# Patient Record
Sex: FEMALE | Race: WHITE | Employment: UNEMPLOYED | URBAN - NONMETROPOLITAN AREA
[De-identification: names, ages, dates, MRNs, and addresses within clinical notes are randomized per-mention and may not be internally consistent; named-entity substitution may affect disease eponyms.]

---
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
## 2020-08-28 ENCOUNTER — HOSPITAL ENCOUNTER (OUTPATIENT)
Dept: NURSING | Age: 20
Discharge: HOME | End: 2020-08-28
Payer: COMMERCIAL

## 2020-08-28 VITALS
DIASTOLIC BLOOD PRESSURE: 66 MMHG | RESPIRATION RATE: 16 BRPM | TEMPERATURE: 97.5 F | HEART RATE: 70 BPM | SYSTOLIC BLOOD PRESSURE: 106 MMHG

## 2020-08-28 LAB
ANTIBODY SCREEN: NORMAL
GESTATIONAL AGE(WEEKS): NORMAL

## 2020-08-28 PROCEDURE — 96372 THER/PROPH/DIAG INJ SC/IM: CPT

## 2020-08-28 PROCEDURE — 86850 RBC ANTIBODY SCREEN: CPT

## 2020-08-28 PROCEDURE — 6360000002 HC RX W HCPCS: Performed by: NURSE PRACTITIONER

## 2020-08-28 PROCEDURE — 36415 COLL VENOUS BLD VENIPUNCTURE: CPT

## 2020-08-28 RX ADMIN — HUMAN RHO(D) IMMUNE GLOBULIN 300 MCG: 300 INJECTION, SOLUTION INTRAMUSCULAR at 10:44

## 2020-08-28 NOTE — PROGRESS NOTES
1020 pt arrives to OPN for Rhogam injection. All questions and concerns addressed. 28 weeks gestation confirmed  26 PATIENT RIGHTS AND RESPONSIBILITIES SHEET OFFERED TO PT TO READ.   1025 lab at bedside  1030 call light in reach, refreshments offered  1035 _m___ Safety:       (Environmental)  Phuc Greych to environment   Ensure ID band is correct and in place/ allergy band as needed   Assess for fall risk   Initiate fall precautions as applicable (fall band, side rails, etc.)   Call light within reach   Bed in low position/ wheels locked    __m__ Pain:        Assess pain level and characteristics   Administer analgesics as ordered   Assess effectiveness of pain management and report to MD as needed    _m___ Knowledge Deficit:   Assess baseline knowledge   Provide teaching at level of understanding   Provide teaching via preferred learning method   Evaluate teaching effectiveness    __m_ Hemodynamic/Respiratory Status:       (Pre and Post Procedure Monitoring)   Assess/Monitor vital signs and LOC   Assess Baseline SpO2 prior to any sedation   Obtain weight/height   Assess vital signs/ LOC until patient meets discharge criteria   Monitor procedure site and notify MD of any issues       1045 WRITTEN DISCHARGE INSTRUCTIONS GIVEN TO PT-VERBALIZES UNDERSTANDING  1050 PT DISCHARGED AMBULATORY IN SATISFACTORY CONDITION

## 2020-10-19 PROBLEM — O47.9 FALSE LABOR: Status: ACTIVE | Noted: 2020-10-19

## 2020-11-15 ENCOUNTER — HOSPITAL ENCOUNTER (INPATIENT)
Age: 20
LOS: 3 days | Discharge: HOME | DRG: 560 | End: 2020-11-18
Attending: OBSTETRICS & GYNECOLOGY | Admitting: OBSTETRICS & GYNECOLOGY
Payer: MEDICARE

## 2020-11-15 ENCOUNTER — APPOINTMENT (OUTPATIENT)
Dept: LABOR AND DELIVERY | Age: 20
DRG: 560 | End: 2020-11-15
Payer: MEDICARE

## 2020-11-15 LAB
ABO: NORMAL
ANION GAP SERPL CALCULATED.3IONS-SCNC: 13 MEQ/L (ref 8–16)
ANTIBODY SCREEN: NORMAL
BASOPHILS # BLD: 0.2 %
BASOPHILS ABSOLUTE: 0 THOU/MM3 (ref 0–0.1)
BUN BLDV-MCNC: 10 MG/DL (ref 7–22)
CALCIUM SERPL-MCNC: 9.3 MG/DL (ref 8.5–10.5)
CHLORIDE BLD-SCNC: 101 MEQ/L (ref 98–111)
CO2: 23 MEQ/L (ref 23–33)
CREAT SERPL-MCNC: 0.6 MG/DL (ref 0.4–1.2)
EOSINOPHIL # BLD: 1.2 %
EOSINOPHILS ABSOLUTE: 0.1 THOU/MM3 (ref 0–0.4)
ERYTHROCYTE [DISTWIDTH] IN BLOOD BY AUTOMATED COUNT: 14 % (ref 11.5–14.5)
ERYTHROCYTE [DISTWIDTH] IN BLOOD BY AUTOMATED COUNT: 44.4 FL (ref 35–45)
GFR SERPL CREATININE-BSD FRML MDRD: > 90 ML/MIN/1.73M2
GLUCOSE BLD-MCNC: 103 MG/DL (ref 70–108)
HCT VFR BLD CALC: 35.7 % (ref 37–47)
HEMOGLOBIN: 11.8 GM/DL (ref 12–16)
IMMATURE GRANS (ABS): 0.04 THOU/MM3 (ref 0–0.07)
IMMATURE GRANULOCYTES: 0.3 %
LYMPHOCYTES # BLD: 21.6 %
LYMPHOCYTES ABSOLUTE: 2.6 THOU/MM3 (ref 1–4.8)
MCH RBC QN AUTO: 29.4 PG (ref 26–33)
MCHC RBC AUTO-ENTMCNC: 33.1 GM/DL (ref 32.2–35.5)
MCV RBC AUTO: 88.8 FL (ref 81–99)
MONOCYTES # BLD: 6 %
MONOCYTES ABSOLUTE: 0.7 THOU/MM3 (ref 0.4–1.3)
NUCLEATED RED BLOOD CELLS: 0 /100 WBC
PLATELET # BLD: 314 THOU/MM3 (ref 130–400)
PMV BLD AUTO: 9.7 FL (ref 9.4–12.4)
POTASSIUM SERPL-SCNC: 4.1 MEQ/L (ref 3.5–5.2)
RBC # BLD: 4.02 MILL/MM3 (ref 4.2–5.4)
RH FACTOR: NORMAL
SEG NEUTROPHILS: 70.7 %
SEGMENTED NEUTROPHILS ABSOLUTE COUNT: 8.4 THOU/MM3 (ref 1.8–7.7)
SODIUM BLD-SCNC: 137 MEQ/L (ref 135–145)
WBC # BLD: 11.9 THOU/MM3 (ref 4.8–10.8)

## 2020-11-15 PROCEDURE — 3E033VJ INTRODUCTION OF OTHER HORMONE INTO PERIPHERAL VEIN, PERCUTANEOUS APPROACH: ICD-10-PCS | Performed by: OBSTETRICS & GYNECOLOGY

## 2020-11-15 PROCEDURE — 86592 SYPHILIS TEST NON-TREP QUAL: CPT

## 2020-11-15 PROCEDURE — 1220000001 HC SEMI PRIVATE L&D R&B

## 2020-11-15 PROCEDURE — 85025 COMPLETE CBC W/AUTO DIFF WBC: CPT

## 2020-11-15 PROCEDURE — 6360000002 HC RX W HCPCS

## 2020-11-15 PROCEDURE — 80048 BASIC METABOLIC PNL TOTAL CA: CPT

## 2020-11-15 PROCEDURE — 6360000002 HC RX W HCPCS: Performed by: OBSTETRICS & GYNECOLOGY

## 2020-11-15 PROCEDURE — 80307 DRUG TEST PRSMV CHEM ANLYZR: CPT

## 2020-11-15 PROCEDURE — 86850 RBC ANTIBODY SCREEN: CPT

## 2020-11-15 PROCEDURE — 86901 BLOOD TYPING SEROLOGIC RH(D): CPT

## 2020-11-15 PROCEDURE — 10907ZC DRAINAGE OF AMNIOTIC FLUID, THERAPEUTIC FROM PRODUCTS OF CONCEPTION, VIA NATURAL OR ARTIFICIAL OPENING: ICD-10-PCS | Performed by: OBSTETRICS & GYNECOLOGY

## 2020-11-15 PROCEDURE — 2580000003 HC RX 258: Performed by: OBSTETRICS & GYNECOLOGY

## 2020-11-15 PROCEDURE — 36415 COLL VENOUS BLD VENIPUNCTURE: CPT

## 2020-11-15 PROCEDURE — 86900 BLOOD TYPING SEROLOGIC ABO: CPT

## 2020-11-15 RX ORDER — SEVOFLURANE 250 ML/250ML
1 LIQUID RESPIRATORY (INHALATION) CONTINUOUS PRN
Status: DISCONTINUED | OUTPATIENT
Start: 2020-11-15 | End: 2020-11-16 | Stop reason: HOSPADM

## 2020-11-15 RX ORDER — SODIUM CHLORIDE, SODIUM LACTATE, POTASSIUM CHLORIDE, CALCIUM CHLORIDE 600; 310; 30; 20 MG/100ML; MG/100ML; MG/100ML; MG/100ML
INJECTION, SOLUTION INTRAVENOUS CONTINUOUS
Status: DISCONTINUED | OUTPATIENT
Start: 2020-11-15 | End: 2020-11-16

## 2020-11-15 RX ORDER — MISOPROSTOL 200 UG/1
1000 TABLET ORAL PRN
Status: DISCONTINUED | OUTPATIENT
Start: 2020-11-15 | End: 2020-11-16 | Stop reason: HOSPADM

## 2020-11-15 RX ORDER — MORPHINE SULFATE 2 MG/ML
2 INJECTION, SOLUTION INTRAMUSCULAR; INTRAVENOUS
Status: DISCONTINUED | OUTPATIENT
Start: 2020-11-15 | End: 2020-11-16 | Stop reason: HOSPADM

## 2020-11-15 RX ORDER — IBUPROFEN 800 MG/1
800 TABLET ORAL EVERY 8 HOURS PRN
Status: DISCONTINUED | OUTPATIENT
Start: 2020-11-15 | End: 2020-11-16 | Stop reason: HOSPADM

## 2020-11-15 RX ORDER — MORPHINE SULFATE 4 MG/ML
4 INJECTION, SOLUTION INTRAMUSCULAR; INTRAVENOUS
Status: DISCONTINUED | OUTPATIENT
Start: 2020-11-15 | End: 2020-11-16 | Stop reason: HOSPADM

## 2020-11-15 RX ORDER — LIDOCAINE HYDROCHLORIDE 10 MG/ML
30 INJECTION, SOLUTION EPIDURAL; INFILTRATION; INTRACAUDAL; PERINEURAL PRN
Status: DISCONTINUED | OUTPATIENT
Start: 2020-11-15 | End: 2020-11-16 | Stop reason: HOSPADM

## 2020-11-15 RX ORDER — PENICILLIN G 3000000 [IU]/50ML
3 INJECTION, SOLUTION INTRAVENOUS EVERY 4 HOURS
Status: DISCONTINUED | OUTPATIENT
Start: 2020-11-16 | End: 2020-11-16 | Stop reason: HOSPADM

## 2020-11-15 RX ORDER — METHYLERGONOVINE MALEATE 0.2 MG/ML
200 INJECTION INTRAVENOUS PRN
Status: DISCONTINUED | OUTPATIENT
Start: 2020-11-15 | End: 2020-11-16 | Stop reason: HOSPADM

## 2020-11-15 RX ORDER — BUTORPHANOL TARTRATE 1 MG/ML
1 INJECTION, SOLUTION INTRAMUSCULAR; INTRAVENOUS
Status: DISCONTINUED | OUTPATIENT
Start: 2020-11-15 | End: 2020-11-16 | Stop reason: HOSPADM

## 2020-11-15 RX ORDER — ACETAMINOPHEN 325 MG/1
650 TABLET ORAL EVERY 4 HOURS PRN
Status: DISCONTINUED | OUTPATIENT
Start: 2020-11-15 | End: 2020-11-16 | Stop reason: HOSPADM

## 2020-11-15 RX ORDER — ONDANSETRON 2 MG/ML
8 INJECTION INTRAMUSCULAR; INTRAVENOUS EVERY 6 HOURS PRN
Status: DISCONTINUED | OUTPATIENT
Start: 2020-11-15 | End: 2020-11-16 | Stop reason: HOSPADM

## 2020-11-15 RX ORDER — TERBUTALINE SULFATE 1 MG/ML
0.25 INJECTION, SOLUTION SUBCUTANEOUS
Status: ACTIVE | OUTPATIENT
Start: 2020-11-15 | End: 2020-11-15

## 2020-11-15 RX ORDER — SODIUM CHLORIDE 0.9 % (FLUSH) 0.9 %
10 SYRINGE (ML) INJECTION PRN
Status: DISCONTINUED | OUTPATIENT
Start: 2020-11-15 | End: 2020-11-16 | Stop reason: HOSPADM

## 2020-11-15 RX ORDER — DIPHENHYDRAMINE HYDROCHLORIDE 50 MG/ML
25 INJECTION INTRAMUSCULAR; INTRAVENOUS EVERY 4 HOURS PRN
Status: DISCONTINUED | OUTPATIENT
Start: 2020-11-15 | End: 2020-11-16 | Stop reason: HOSPADM

## 2020-11-15 RX ADMIN — Medication 1 MILLI-UNITS/MIN: at 23:24

## 2020-11-15 RX ADMIN — DEXTROSE MONOHYDRATE 5 MILLION UNITS: 5 INJECTION INTRAVENOUS at 23:09

## 2020-11-15 RX ADMIN — SODIUM CHLORIDE, POTASSIUM CHLORIDE, SODIUM LACTATE AND CALCIUM CHLORIDE: 600; 310; 30; 20 INJECTION, SOLUTION INTRAVENOUS at 21:00

## 2020-11-16 ENCOUNTER — ANESTHESIA (OUTPATIENT)
Dept: LABOR AND DELIVERY | Age: 20
DRG: 560 | End: 2020-11-16
Payer: MEDICARE

## 2020-11-16 ENCOUNTER — ANESTHESIA EVENT (OUTPATIENT)
Dept: LABOR AND DELIVERY | Age: 20
DRG: 560 | End: 2020-11-16
Payer: MEDICARE

## 2020-11-16 LAB
AMPHETAMINE+METHAMPHETAMINE URINE SCREEN: NEGATIVE
BARBITURATE QUANTITATIVE URINE: NEGATIVE
BENZODIAZEPINE QUANTITATIVE URINE: NEGATIVE
CANNABINOID QUANTITATIVE URINE: POSITIVE
COCAINE METABOLITE QUANTITATIVE URINE: NEGATIVE
OPIATES, URINE: NEGATIVE
OXYCODONE: NEGATIVE
PHENCYCLIDINE QUANTITATIVE URINE: NEGATIVE
RPR: NONREACTIVE

## 2020-11-16 PROCEDURE — 7200000001 HC VAGINAL DELIVERY

## 2020-11-16 PROCEDURE — 3700000025 EPIDURAL BLOCK: Performed by: ANESTHESIOLOGY

## 2020-11-16 PROCEDURE — 1200000000 HC SEMI PRIVATE

## 2020-11-16 PROCEDURE — 6360000002 HC RX W HCPCS: Performed by: OBSTETRICS & GYNECOLOGY

## 2020-11-16 PROCEDURE — 6370000000 HC RX 637 (ALT 250 FOR IP): Performed by: OBSTETRICS & GYNECOLOGY

## 2020-11-16 PROCEDURE — 2580000003 HC RX 258: Performed by: OBSTETRICS & GYNECOLOGY

## 2020-11-16 PROCEDURE — 2500000003 HC RX 250 WO HCPCS: Performed by: ANESTHESIOLOGY

## 2020-11-16 PROCEDURE — 6360000002 HC RX W HCPCS: Performed by: NURSE ANESTHETIST, CERTIFIED REGISTERED

## 2020-11-16 RX ORDER — ROPIVACAINE HYDROCHLORIDE 2 MG/ML
INJECTION, SOLUTION EPIDURAL; INFILTRATION; PERINEURAL PRN
Status: DISCONTINUED | OUTPATIENT
Start: 2020-11-16 | End: 2020-11-16 | Stop reason: SDUPTHER

## 2020-11-16 RX ORDER — CARBOPROST TROMETHAMINE 250 UG/ML
250 INJECTION, SOLUTION INTRAMUSCULAR
Status: ACTIVE | OUTPATIENT
Start: 2020-11-16 | End: 2020-11-16

## 2020-11-16 RX ORDER — FERROUS SULFATE 325(65) MG
325 TABLET ORAL
Status: DISCONTINUED | OUTPATIENT
Start: 2020-11-17 | End: 2020-11-18 | Stop reason: HOSPADM

## 2020-11-16 RX ORDER — MISOPROSTOL 200 UG/1
800 TABLET ORAL
Status: ACTIVE | OUTPATIENT
Start: 2020-11-16 | End: 2020-11-16

## 2020-11-16 RX ORDER — ONDANSETRON 2 MG/ML
4 INJECTION INTRAMUSCULAR; INTRAVENOUS EVERY 6 HOURS PRN
Status: DISCONTINUED | OUTPATIENT
Start: 2020-11-16 | End: 2020-11-18 | Stop reason: HOSPADM

## 2020-11-16 RX ORDER — HYDROCODONE BITARTRATE AND ACETAMINOPHEN 5; 325 MG/1; MG/1
2 TABLET ORAL EVERY 4 HOURS PRN
Status: DISCONTINUED | OUTPATIENT
Start: 2020-11-16 | End: 2020-11-18 | Stop reason: HOSPADM

## 2020-11-16 RX ORDER — MORPHINE SULFATE 4 MG/ML
4 INJECTION, SOLUTION INTRAMUSCULAR; INTRAVENOUS
Status: DISCONTINUED | OUTPATIENT
Start: 2020-11-16 | End: 2020-11-18 | Stop reason: HOSPADM

## 2020-11-16 RX ORDER — HYDROCODONE BITARTRATE AND ACETAMINOPHEN 5; 325 MG/1; MG/1
1 TABLET ORAL EVERY 4 HOURS PRN
Status: DISCONTINUED | OUTPATIENT
Start: 2020-11-16 | End: 2020-11-18 | Stop reason: HOSPADM

## 2020-11-16 RX ORDER — ZOLPIDEM TARTRATE 5 MG/1
5 TABLET ORAL ONCE
Status: COMPLETED | OUTPATIENT
Start: 2020-11-16 | End: 2020-11-16

## 2020-11-16 RX ORDER — ONDANSETRON 2 MG/ML
4 INJECTION INTRAMUSCULAR; INTRAVENOUS EVERY 6 HOURS PRN
Status: DISCONTINUED | OUTPATIENT
Start: 2020-11-16 | End: 2020-11-16 | Stop reason: HOSPADM

## 2020-11-16 RX ORDER — CARBOPROST TROMETHAMINE 250 UG/ML
250 INJECTION, SOLUTION INTRAMUSCULAR PRN
Status: DISCONTINUED | OUTPATIENT
Start: 2020-11-16 | End: 2020-11-18 | Stop reason: HOSPADM

## 2020-11-16 RX ORDER — FENTANYL CITRATE 50 UG/ML
INJECTION, SOLUTION INTRAMUSCULAR; INTRAVENOUS
Status: COMPLETED
Start: 2020-11-16 | End: 2020-11-16

## 2020-11-16 RX ORDER — SODIUM CHLORIDE, SODIUM LACTATE, POTASSIUM CHLORIDE, CALCIUM CHLORIDE 600; 310; 30; 20 MG/100ML; MG/100ML; MG/100ML; MG/100ML
INJECTION, SOLUTION INTRAVENOUS CONTINUOUS
Status: DISCONTINUED | OUTPATIENT
Start: 2020-11-16 | End: 2020-11-18 | Stop reason: HOSPADM

## 2020-11-16 RX ORDER — DOCUSATE SODIUM 100 MG/1
100 CAPSULE, LIQUID FILLED ORAL 2 TIMES DAILY PRN
Status: DISCONTINUED | OUTPATIENT
Start: 2020-11-16 | End: 2020-11-18 | Stop reason: HOSPADM

## 2020-11-16 RX ORDER — ONDANSETRON 4 MG/1
8 TABLET, ORALLY DISINTEGRATING ORAL EVERY 8 HOURS PRN
Status: DISCONTINUED | OUTPATIENT
Start: 2020-11-16 | End: 2020-11-18 | Stop reason: HOSPADM

## 2020-11-16 RX ORDER — ROPIVACAINE HYDROCHLORIDE 2 MG/ML
INJECTION, SOLUTION EPIDURAL; INFILTRATION; PERINEURAL
Status: COMPLETED
Start: 2020-11-16 | End: 2020-11-16

## 2020-11-16 RX ORDER — ZOLPIDEM TARTRATE 10 MG/1
10 TABLET ORAL NIGHTLY PRN
Status: DISCONTINUED | OUTPATIENT
Start: 2020-11-16 | End: 2020-11-18 | Stop reason: HOSPADM

## 2020-11-16 RX ORDER — FENTANYL CITRATE 50 UG/ML
INJECTION, SOLUTION INTRAMUSCULAR; INTRAVENOUS PRN
Status: DISCONTINUED | OUTPATIENT
Start: 2020-11-16 | End: 2020-11-16 | Stop reason: SDUPTHER

## 2020-11-16 RX ORDER — NALOXONE HYDROCHLORIDE 0.4 MG/ML
0.4 INJECTION, SOLUTION INTRAMUSCULAR; INTRAVENOUS; SUBCUTANEOUS PRN
Status: DISCONTINUED | OUTPATIENT
Start: 2020-11-16 | End: 2020-11-16 | Stop reason: HOSPADM

## 2020-11-16 RX ORDER — MODIFIED LANOLIN
OINTMENT (GRAM) TOPICAL PRN
Status: DISCONTINUED | OUTPATIENT
Start: 2020-11-16 | End: 2020-11-18 | Stop reason: HOSPADM

## 2020-11-16 RX ORDER — DIPHENHYDRAMINE HCL 25 MG
50 TABLET ORAL EVERY 6 HOURS PRN
Status: DISCONTINUED | OUTPATIENT
Start: 2020-11-16 | End: 2020-11-18 | Stop reason: HOSPADM

## 2020-11-16 RX ORDER — IBUPROFEN 800 MG/1
800 TABLET ORAL 3 TIMES DAILY
Status: DISCONTINUED | OUTPATIENT
Start: 2020-11-16 | End: 2020-11-18 | Stop reason: HOSPADM

## 2020-11-16 RX ORDER — SODIUM CHLORIDE 0.9 % (FLUSH) 0.9 %
10 SYRINGE (ML) INJECTION EVERY 12 HOURS SCHEDULED
Status: DISCONTINUED | OUTPATIENT
Start: 2020-11-16 | End: 2020-11-18

## 2020-11-16 RX ORDER — SODIUM CHLORIDE 0.9 % (FLUSH) 0.9 %
10 SYRINGE (ML) INJECTION PRN
Status: DISCONTINUED | OUTPATIENT
Start: 2020-11-16 | End: 2020-11-18

## 2020-11-16 RX ORDER — METHYLERGONOVINE MALEATE 0.2 MG/ML
200 INJECTION INTRAVENOUS
Status: ACTIVE | OUTPATIENT
Start: 2020-11-16 | End: 2020-11-16

## 2020-11-16 RX ORDER — HYDROCORTISONE ACETATE 25 MG/1
25 SUPPOSITORY RECTAL 2 TIMES DAILY PRN
Status: DISCONTINUED | OUTPATIENT
Start: 2020-11-16 | End: 2020-11-18 | Stop reason: HOSPADM

## 2020-11-16 RX ORDER — MORPHINE SULFATE 4 MG/ML
2 INJECTION, SOLUTION INTRAMUSCULAR; INTRAVENOUS
Status: DISCONTINUED | OUTPATIENT
Start: 2020-11-16 | End: 2020-11-18 | Stop reason: HOSPADM

## 2020-11-16 RX ORDER — NALBUPHINE HCL 10 MG/ML
5 AMPUL (ML) INJECTION EVERY 4 HOURS PRN
Status: DISCONTINUED | OUTPATIENT
Start: 2020-11-16 | End: 2020-11-16 | Stop reason: HOSPADM

## 2020-11-16 RX ORDER — ACETAMINOPHEN 325 MG/1
650 TABLET ORAL EVERY 4 HOURS PRN
Status: DISCONTINUED | OUTPATIENT
Start: 2020-11-16 | End: 2020-11-18 | Stop reason: HOSPADM

## 2020-11-16 RX ADMIN — ZOLPIDEM TARTRATE 5 MG: 5 TABLET ORAL at 00:28

## 2020-11-16 RX ADMIN — ROPIVACAINE HYDROCHLORIDE 8 ML: 2 INJECTION, SOLUTION EPIDURAL; INFILTRATION at 15:23

## 2020-11-16 RX ADMIN — SODIUM CHLORIDE, POTASSIUM CHLORIDE, SODIUM LACTATE AND CALCIUM CHLORIDE: 600; 310; 30; 20 INJECTION, SOLUTION INTRAVENOUS at 12:30

## 2020-11-16 RX ADMIN — PENICILLIN G 3 MILLION UNITS: 3000000 INJECTION, SOLUTION INTRAVENOUS at 03:30

## 2020-11-16 RX ADMIN — PENICILLIN G 3 MILLION UNITS: 3000000 INJECTION, SOLUTION INTRAVENOUS at 15:28

## 2020-11-16 RX ADMIN — FENTANYL CITRATE 100 MCG: 50 INJECTION, SOLUTION INTRAMUSCULAR; INTRAVENOUS at 15:23

## 2020-11-16 RX ADMIN — BUTORPHANOL TARTRATE 1 MG: 1 INJECTION, SOLUTION INTRAMUSCULAR; INTRAVENOUS at 04:45

## 2020-11-16 RX ADMIN — ONDANSETRON 8 MG: 2 INJECTION INTRAMUSCULAR; INTRAVENOUS at 04:23

## 2020-11-16 RX ADMIN — PENICILLIN G 3 MILLION UNITS: 3000000 INJECTION, SOLUTION INTRAVENOUS at 07:39

## 2020-11-16 RX ADMIN — ROPIVACAINE HYDROCHLORIDE 8 ML: 2 INJECTION, SOLUTION EPIDURAL; INFILTRATION at 18:05

## 2020-11-16 RX ADMIN — PENICILLIN G 3 MILLION UNITS: 3000000 INJECTION, SOLUTION INTRAVENOUS at 19:35

## 2020-11-16 RX ADMIN — ONDANSETRON 8 MG: 2 INJECTION INTRAMUSCULAR; INTRAVENOUS at 12:36

## 2020-11-16 RX ADMIN — Medication 16 ML/HR: at 07:02

## 2020-11-16 RX ADMIN — PENICILLIN G 3 MILLION UNITS: 3000000 INJECTION, SOLUTION INTRAVENOUS at 11:32

## 2020-11-16 RX ADMIN — SODIUM CHLORIDE, POTASSIUM CHLORIDE, SODIUM LACTATE AND CALCIUM CHLORIDE: 600; 310; 30; 20 INJECTION, SOLUTION INTRAVENOUS at 04:09

## 2020-11-16 RX ADMIN — FENTANYL CITRATE 100 MCG: 50 INJECTION, SOLUTION INTRAMUSCULAR; INTRAVENOUS at 18:05

## 2020-11-16 RX ADMIN — SODIUM CHLORIDE, POTASSIUM CHLORIDE, SODIUM LACTATE AND CALCIUM CHLORIDE: 600; 310; 30; 20 INJECTION, SOLUTION INTRAVENOUS at 07:37

## 2020-11-16 RX ADMIN — IBUPROFEN 800 MG: 800 TABLET, FILM COATED ORAL at 23:47

## 2020-11-16 ASSESSMENT — PAIN SCALES - GENERAL
PAINLEVEL_OUTOF10: 1
PAINLEVEL_OUTOF10: 5
PAINLEVEL_OUTOF10: 1

## 2020-11-16 ASSESSMENT — PAIN DESCRIPTION - DESCRIPTORS
DESCRIPTORS: CRAMPING
DESCRIPTORS: CRAMPING

## 2020-11-16 NOTE — FLOWSHEET NOTE
Dr Alexandria Ocampo called the unit for an update, updated pt last checked an hour ago 6/80/-1, Md viewing strip from the office, fht's looking better, first time all day pt has not had repetitive variables, she was angry 2 hours ago when she was in pain, got epidural redosed and pt comfortable now, has not rested all day since needing all the position changes, encouraged her to rest, pitocin at 6 milliunits, has not been higher then that all day, ctx are good intensity, 2-3 min apart. Md is signing out to Dr Shan Amin.

## 2020-11-16 NOTE — ANESTHESIA PRE PROCEDURE
Department of Anesthesiology  Preprocedure Note       Name:  Nikkie Rosenthal   Age:  21 y.o.  :  2000                                          MRN:  [id number]         Date:  2020      Surgeon: * No surgeons listed *    Procedure: * No procedures listed *    Medications prior to admission:   Prior to Admission medications    Medication Sig Start Date End Date Taking?  Authorizing Provider   Prenatal MV-Min-Fe Fum-FA-DHA (PRENATAL 1 PO) Take by mouth   Yes Historical Provider, MD   ondansetron (ZOFRAN) 4 MG tablet 1 tablet every 4- 6  hours for nausea vomiting 19   Zayra Carreno MD       Current medications:    Current Facility-Administered Medications   Medication Dose Route Frequency Provider Last Rate Last Dose    lactated ringers infusion   Intravenous Continuous Sandhya Osullivan  mL/hr at 20 0409      sodium chloride flush 0.9 % injection 10 mL  10 mL Intravenous PRN Sandhya Osullivan MD        lidocaine PF 1 % injection 30 mL  30 mL Other PRN Sandhya Osullivan MD        butorphanol (STADOL) injection 1 mg  1 mg Intravenous Q1H PRN Sandhya Osullivan MD   1 mg at 20 0445    nitrous oxide 50% inhalation 1 each  1 each Inhalation Continuous PRN Sandhya Osullivan MD        acetaminophen (TYLENOL) tablet 650 mg  650 mg Oral Q4H PRN Sandhya Osullivan MD        ibuprofen (ADVIL;MOTRIN) tablet 800 mg  800 mg Oral Q8H PRN Sandhya Osullivan MD        morphine (PF) injection 2 mg  2 mg Intravenous Q2H PRN Sandhya Osullivan MD        Or   Jl Owens morphine injection 4 mg  4 mg Intravenous Q2H PRN Sandhya Osullivan MD        oxytocin (PITOCIN) 30 units in 500 mL infusion  1 cristobal-units/min Intravenous Continuous Sandhya Osullivan MD 6 mL/hr at 20 0410 6 cristobal-units/min at 20 0410    diphenhydrAMINE (BENADRYL) injection 25 mg  25 mg Intravenous Q4H PRN Sandhya Osullivan MD        [facility]etron [facility] injection 8 mg  8 mg Intravenous Q6H PRN Sandhya Osullivan MD   8 mg at 20 0423    oxytocin (PITOCIN) 10 unit bolus from the bag  10 Units Intravenous PRN Dedrick Bamberger, MD        And    oxytocin (PITOCIN) 30 units in 500 mL infusion  95 cristobal-units/min Intravenous PRN Dedrick Bamberger, MD        methylergonovine (METHERGINE) injection 200 mcg  200 mcg Intramuscular PRN Dedrick Bamberger, MD        miSOPROStol (CYTOTEC) tablet 1,000 mcg  1,000 mcg Rectal PRN Dedrick Bamberger, MD        witch hazel-glycerin (TUCKS) pad   Topical PRN Dedrick Bamberger, MD        benzocaine-menthol (DERMOPLAST) 20-0.5 % spray   Topical PRN Dedrick Bamberger, MD        penicillin G potassium IVPB 3 Million Units  3 Million Units Intravenous Q4H Dedrick Bamberger, MD   Stopped at 20 0400       Allergies:  No Known Allergies    Problem List:    Patient Active Problem List   Diagnosis Code    False labor O47.9       Past Medical History:  History reviewed. No pertinent past medical history. Past Surgical History:  History reviewed. No pertinent surgical history.     Social History:    Social History     Tobacco Use    Smoking status: Former Smoker     Packs/day: 1.50     Types: Cigarettes     Last attempt to quit: 2019     Years since quittin.5    Smokeless tobacco: Never Used   Substance Use Topics    Alcohol use: Never     Frequency: Never                                Counseling given: Not Answered      Vital Signs (Current):   Vitals:    20 0409 20 0424 20 0454 20 0524   BP:  102/63 126/82 120/81   Pulse:  73 70 67   Resp:   20   Temp: 36.3 °C (97.3 °F)      TempSrc: Temporal      SpO2: 99%      Weight:       Height:                                                  BP Readings from Last 3 Encounters:   20 120/81   10/19/20 122/79   20 106/66       NPO Status: Time of last liquid consumption: 1900                        Time of last solid consumption: 1400                        Date of last liquid consumption: 11/15/20                        Date of last solid food consumption: 11/15/20    BMI:   Wt Readings from Last 3 Encounters:   11/15/20 186 lb (84.4 kg)   08/22/19 212 lb (96.2 kg) (98 %, Z= 2.11)*   05/10/19 (!) 220 lb (99.8 kg) (99 %, Z= 2.20)*     * Growth percentiles are based on [facility] (Girls, 2-20 Years) data. Body mass index is 30.02 kg/m². CBC:   Lab Results   Component Value Date    WBC 11.9 11/15/2020    RBC 4.02 11/15/2020    HGB 11.8 11/15/2020    HCT 35.7 11/15/2020    MCV 88.8 11/15/2020    RDW 14.8 06/18/2016     11/15/2020       CMP:   Lab Results   Component Value Date     11/15/2020    K 4.1 11/15/2020     11/15/2020    CO2 23 11/15/2020    BUN 10 11/15/2020    CREATININE 0.6 11/15/2020    LABGLOM >90 11/15/2020    GLUCOSE 103 11/15/2020    PROT 7.8 08/22/2019    CALCIUM 9.3 11/15/2020    BILITOT 0.7 08/22/2019    ALKPHOS 107 08/22/2019    AST 23 08/22/2019    ALT 12 08/22/2019       POC Tests: No results for input(s): POCGLU, POCNA, POCK, POCCL, POCBUN, POCHEMO, POCHCT in the last 72 hours.     Coags: No results found for: PROTIME, INR, APTT    HCG (If Applicable):   Lab Results   Component Value Date    PREGSERUM NEGATIVE 08/22/2019        ABGs: No results found for: PHART, PO2ART, ETN2GUP, MEN4NEO, BEART, A9QLZGDV     Type & Screen (If Applicable):  Lab Results   Component Value Date    LABRH NEG 11/15/2020       Drug/Infectious Status (If Applicable):  No results found for: HIV, HEPCAB    COVID-19 Screening (If Applicable): No results found for: COVID19      Anesthesia Evaluation  Patient summary reviewed and Nursing notes reviewed no history of anesthetic complications:   Airway: Mallampati: II  TM distance: >3 FB   Neck ROM: full  Mouth opening: > = 3 FB Dental:          Pulmonary:Negative Pulmonary ROS                              Cardiovascular:  Exercise tolerance: good (>4 METS),                     Neuro/Psych:   Negative Neuro/Psych ROS              GI/Hepatic/Renal: Neg GI/Hepatic/Renal ROS Endo/Other: Negative Endo/Other ROS                    Abdominal:           Vascular: negative vascular ROS. Anesthesia Plan      epidural     ASA 2             Anesthetic plan and risks discussed with patient. Plan discussed with attending.                   DULCE Nash - CRNA   11/16/2020

## 2020-11-16 NOTE — PLAN OF CARE
Problem: Anxiety:  Goal: Level of anxiety will decrease  Description: Level of anxiety will decrease  Outcome: Ongoing  Note: Pt calm, sister and her mother at bedside     Problem: Breathing Pattern - Ineffective:  Goal: Able to breathe comfortably  Description: Able to breathe comfortably  Outcome: Ongoing  Note: Resp 18     Problem: Fluid Volume - Imbalance:  Goal: Absence of imbalanced fluid volume signs and symptoms  Description: Absence of imbalanced fluid volume signs and symptoms  Outcome: Ongoing  Note: I/O normal  Goal: Absence of intrapartum hemorrhage signs and symptoms  Description: Absence of intrapartum hemorrhage signs and symptoms  Outcome: Ongoing  Note: No signs of extra bleeding     Problem: Infection - Intrapartum Infection:  Goal: Will show no infection signs and symptoms  Description: Will show no infection signs and symptoms  Outcome: Ongoing  Note: No temps     Problem: Labor Process - Prolonged:  Goal: Uterine contractions within specified parameters  Description: Uterine contractions within specified parameters  Outcome: Ongoing  Note: Ctx 2-3 min apart     Problem:  Screening:  Goal: Ability to make informed decisions regarding treatment has improved  Description: Ability to make informed decisions regarding treatment has improved  Outcome: Ongoing  Note: Pt able to make decisions     Problem: Pain - Acute:  Goal: Pain level will decrease  Description: Pain level will decrease  Outcome: Ongoing  Note: Pt comfortable with epidural, pain 0, pain goal 8     Problem: Tissue Perfusion - Uteroplacental, Altered:  Goal: Absence of abnormal fetal heart rate pattern  Description: Absence of abnormal fetal heart rate pattern  Outcome: Ongoing  Note: Reactive fhts     Problem: Urinary Retention:  Goal: Urinary elimination within specified parameters  Description: Urinary elimination within specified parameters  Outcome: Ongoing  Note: Alfonso with clear yellow urine     Problem: Discharge Planning:  Goal: Discharged to appropriate level of care  Description: Discharged to appropriate level of care  Outcome: Ongoing  Note: Transfer to mom baby after recovery  Care plan reviewed with patient and her mother . Patient and  mother verbalize understanding of the plan of care and contribute to goal setting.

## 2020-11-16 NOTE — FLOWSHEET NOTE
RN to bedside pt is screaming at the nurse since she is in pain, RN informed pt we are waiting on CRNA who is on the unit, pt screaming she wants to leave she will go to [place].

## 2020-11-16 NOTE — FLOWSHEET NOTE
Dr Jocelin Lombardi called the unit, she viewed strip from the office, pt is continually having repetitive, deep variables, last had acceleration when pt vomited around 1230, cervix still unchanged just checked 5/80/-2, Md states still good variability, orders to stop pitocin for 30 min then restart.

## 2020-11-16 NOTE — FLOWSHEET NOTE
Dr Latoya Tate returned call notified of repetitive deep variables down to 60's-70's, slow return to baseline, down for 50-60 seconds with each, fse placed, pt placed on both sides with no change, pitocin turned off, orders for amnioinfusion.

## 2020-11-16 NOTE — FLOWSHEET NOTE
Dr Olivia Ruiz text to please view strip again, cervix just checked and still 5/80/-2, pitocin at 4 milliunits.

## 2020-11-16 NOTE — FLOWSHEET NOTE
Pt requesting more pain meds states her back is hurting, informed pt she does not get IV meds with the epidural, explained epidural PCA and pt used that. Pt appears comfortable and is not having to breath through ctx.

## 2020-11-16 NOTE — FLOWSHEET NOTE
Pt of Dr. Rose Merida,  39w2d, for a ballard induction. Pt denies leaking of fluid, vaginal bleeding, and ctx. Pt notes fetal movement. Pt oriented to room and call light system. Pt to bathroom to change into gown and to void, informed of maternal drug testing policy in place on all laboring patients. Verbal consent received, paper consent to be signed and urine to be sent. Explained patients right to have family, representative or physician notified of their admission. Patient has Declined for physician to be notified. Patient has Declined for family/representative to be notified.

## 2020-11-16 NOTE — PROGRESS NOTES
Pt did well overnight. Category 1 tracing. CVX 4/80/-2. ROM for clear fluid.  Continue to titrate pitocin for labor  She is comfortable with an epidural

## 2020-11-16 NOTE — FLOWSHEET NOTE
Dr Bianca Davis text to please call, Md returned call notified pt had amnioinfusion bolus started at 0910, variables have not resolved in the last 3 hours since we last spoke, still has good variability, accelerations at times, variables were not as deep, now back down to 60-70's, pt has been placed in all positions and strip looks the same, pitocin now at 6 milliunits, cervix 5/70/-2, not much change, Md pulling up strip in the office, orders to not turn off pitocin.

## 2020-11-16 NOTE — FLOWSHEET NOTE
Ochoa Joseph CRNA at bedside for epidural placement. Questions and concerns addressed, timeout complete.

## 2020-11-16 NOTE — ANESTHESIA PROCEDURE NOTES
Epidural Block    Patient location during procedure: OB  Reason for block: labor epidural  Staffing  Anesthesiologist: Elham Matthews MD  Resident/CRNA: DULCE Lobo - CRNA  Performed: resident/CRNA   Preanesthetic Checklist  Completed: patient identified, site marked, surgical consent, pre-op evaluation, timeout performed, IV checked, risks and benefits discussed, monitors and equipment checked, anesthesia consent given, oxygen available and patient being monitored  Epidural  Patient position: sitting  Prep: site prepped and draped  Patient monitoring: frequent blood pressure checks and continuous pulse ox  Approach: midline  Location: lumbar (1-5)  Injection technique: ROBYN air  Provider prep: mask  Needle  Needle type: Tuohy   Needle gauge: 18 G  Needle length: 3.5 in  Needle insertion depth: 7 cm  Catheter type: side hole  Catheter size: 18 G  Catheter at skin depth: 12 cm  Test dose: negative  Assessment  Hemodynamics: stable  Attempts: 1

## 2020-11-16 NOTE — FLOWSHEET NOTE
Dr Brooklyn Bashir at the desk updated on pt status, cervix just checked 7-8/80/-1, pt hurting again, just called for a second redose, fht's with good variability, having variables with ctx, not as deep and long as they have been most of the day.

## 2020-11-16 NOTE — FLOWSHEET NOTE
Dr. Clayton Coburn notified that ballard bulb is out and pt requesting something to help her sleep. Order received for [employer].

## 2020-11-16 NOTE — FLOWSHEET NOTE
Dr. Cristi Osullivan at bedside for cervical ballard bulb placement at this time. Questions and concerns addressed, POC discussed with patient.

## 2020-11-16 NOTE — H&P
[address] [place] [address]  History and Physical Update    Pt Name: Mile Palm  MRN: [id number]  YOB: 2000  Date of evaluation: 11/15/2020    [x] I have examined the patient and reviewed the H&P/Consult and there are no changes to the patient or plans. 26yo  at 43/3 for induction of labor due to term gestation. CE: /2. Ballard bulb placed without difficulty. Cat 1 tracing. GBS pos-PCN. Pitocin at 2mu until ballard out and then up per protocol. [] I have examined the patient and reviewed the H&P/Consult and have noted the following changes:       Discussion with the patient and/ or family for proposed care, treatment, services; benefits, risks, side effects; likelihood of achieving goals and potential problems that may occur during recuperation was had and all questions were answered. Discussion with the patient and/ or family of reasonable alternatives to the proposed care, treatment, services and the discussion of the risks, benefits, side effects related to the alternatives and the risk related to not receiving the proposed care treatment services was also had and all questions were answered. If this is for an elective surgical procedure then The patient was counseled at length about the risks of devi Covid-19 during their perioperative period and any recovery window from their procedure. The patient was made aware that devi Covid-19  may worsen their prognosis for recovering from their procedure  and lend to a higher morbidity and/or mortality risk. All material risks, benefits, and reasonable alternatives including postponing the procedure were discussed. The patient  does wish to proceed with the procedure at this time.              Bell Lopez  Electronically signed 11/15/2020 at 10:09 PM

## 2020-11-16 NOTE — FLOWSHEET NOTE
Pts mother at the desk requesting RN, to the bedside, pt vomiting small amount. Too soon for another dose of zofran.

## 2020-11-16 NOTE — FLOWSHEET NOTE
Dr Jeannine Dominguez at bedside to break water, updated pt got her epidural at shift change, fhts having some variables, have not rechecked cervix.

## 2020-11-17 PROCEDURE — 1200000000 HC SEMI PRIVATE

## 2020-11-17 PROCEDURE — 6370000000 HC RX 637 (ALT 250 FOR IP): Performed by: OBSTETRICS & GYNECOLOGY

## 2020-11-17 RX ADMIN — IBUPROFEN 800 MG: 800 TABLET, FILM COATED ORAL at 10:22

## 2020-11-17 RX ADMIN — DOCUSATE SODIUM 100 MG: 100 CAPSULE, LIQUID FILLED ORAL at 13:19

## 2020-11-17 RX ADMIN — IBUPROFEN 800 MG: 800 TABLET, FILM COATED ORAL at 23:16

## 2020-11-17 ASSESSMENT — PAIN SCALES - GENERAL
PAINLEVEL_OUTOF10: 0
PAINLEVEL_OUTOF10: 0
PAINLEVEL_OUTOF10: 1
PAINLEVEL_OUTOF10: 4
PAINLEVEL_OUTOF10: 0

## 2020-11-17 NOTE — PROGRESS NOTES
ANTERIOR LIP. RETURN OF SEVERE VARIABLES, BUT GOOD RECOVERY TO NORMAL BASELINE, GOOD BTBV, ACCELS. WILL HOPE FOR RAPID PROGRESSION TO COMPLETE AND ASSIST WITH VAG BIRTH PRN IF THIS PERSISTS.

## 2020-11-17 NOTE — PLAN OF CARE
Problem: Discharge Planning:  Goal: Discharged to appropriate level of care  Description: Discharged to appropriate level of care  11/17/2020 1442 by Alfredo Burris RN  Outcome: Ongoing     Problem: Constipation:  Goal: Bowel elimination is within specified parameters  Description: Bowel elimination is within specified parameters  11/17/2020 1442 by Alfredo Burris RN  Outcome: Ongoing     Problem: Fluid Volume - Imbalance:  Goal: Absence of postpartum hemorrhage signs and symptoms  Description: Absence of postpartum hemorrhage signs and symptoms  11/17/2020 1442 by Alfredo Burris RN  Outcome: Ongoing     Problem: Infection - Risk of, Puerperal Infection:  Goal: Will show no infection signs and symptoms  Description: Will show no infection signs and symptoms  11/17/2020 1442 by Alfredo Burris RN  Outcome: Ongoing     Problem: Pain - Acute:  Goal: Pain level will decrease  Description: Pain level will decrease  11/17/2020 1442 by Alfredo Burris RN  Outcome: Ongoing     Problem: Falls - Risk of:  Goal: Will remain free from falls  Description: Will remain free from falls  11/17/2020 1442 by Alfredo Burris RN  Outcome: Ongoing   Plan of care reviewed

## 2020-11-17 NOTE — OP NOTE
[address] [place] [address]                                OPERATIVE REPORT    PATIENT NAME: Valencia Rowe                  :        2000  MED REC NO:   [id number]                           ROOM:       0020  ACCOUNT NO:   [de-identified]                           ADMIT DATE: 11/15/2020  PROVIDER:     DUNIA Horton Robertfatoumata OF PROCEDURE:  2020    SURGEON:  Mariel Ramos MD    DELIVERY SUMMARY:  The patient was admitted on 11/15/2020 for Alfonso  catheter cervical ripening to be followed by intravenous Pitocin  induction of labor. She progressed to 4 cm with the Alfonso, underwent  amniotomy for clear fluid with Dr. Larry Khan. She then through the labor  had recurrent and somewhat repetitive severe variable decelerations with  associated good wkil-vr-nbyl variability and intermittent fetal  accelerations. Labor was allowed to continue. She progressed to  complete and was placed in dorsal lithotomy position and experienced a  spontaneous vaginal delivery of a viable infant female over a  second-degree midline episiotomy performed due to soft tissue dystocia  and associated fetal bradycardia. An extremely tight nuchal cord was  encountered. This was doubly clamped and transected prior to the  delivery of the infant's shoulders. Once transected, the infant was  fully delivered, placed on the mother's abdomen and then was removed  from the operative field for evaluation by the Special Care Nursery Team  in attendance. The infant demonstrated a vigorous cry. The Apgars are pending at the  time of this dictation. Segment of cord was obtained for protocol  directed studies. Routine cord blood studies obtained as well. In  addition, a segment of cord was obtained for umbilical cord gases and  they are pending at the time of this dictation. Placenta delivered spontaneously and intact without difficulty. Cervix,  vagina, perineum and vulva explored for any lacerations, none were  found. The episiotomy was second-degree in nature and repaired with 3-0  chromic suture under the analgesia for epidural.  Vagina swept for any  retained products or foreign bodies, none were found. Two sharps  disposed off. Sponge counts were correct. Mother, family and   infant daughter were able to remain in the birthing room in good  condition. Estimated blood loss with delivery is 300 mL. Malcom Madden M.D.    D: 2020 20:41:26       T: 2020 20:46:47     [id number]  Job#: [id number]     Doc#: [id number]    CC:   Maury Graham M.D.

## 2020-11-17 NOTE — FLOWSHEET NOTE
Pt ambulated to bathroom tolerated well, unable to void at this time, due to void x2, pericare, pad and gown changed, ambulated back to wheelchair tolerated well, IV fluids infusing, transferred stable to mom/baby rm 20 with  in arms sister at side with belongings, report given to Juan Garcia RN.

## 2020-11-17 NOTE — FLOWSHEET NOTE
Infant has roomed in with mother this shift except for circumcision . Benefits of rooming in discussed.

## 2020-11-17 NOTE — PLAN OF CARE
Problem: Anxiety:  Goal: Level of anxiety will decrease  Description: Level of anxiety will decrease  11/16/2020 1926 by Ludivina Hill RN  Outcome: Ongoing  Note: Pt calm cooperative resting in bed    11/16/2020 0755 by Octavio Ordaz RN  Outcome: Ongoing  Note: Pt calm, sister and her mother at bedside     Problem: Breathing Pattern - Ineffective:  Goal: Able to breathe comfortably  Description: Able to breathe comfortably  11/16/2020 1926 by Ludivina Hill RN  Outcome: Ongoing  Note: Resp and SpO2 WNL  11/16/2020 0755 by Octavio Ordaz RN  Outcome: Ongoing  Note: Resp 18     Problem: Fluid Volume - Imbalance:  Goal: Absence of imbalanced fluid volume signs and symptoms  Description: Absence of imbalanced fluid volume signs and symptoms  11/16/2020 1926 by Ludivina Hill RN  Outcome: Ongoing  Note: IV fluids infusing, no signs of abnormal bleeding  11/16/2020 0755 by Octavio Ordaz RN  Outcome: Ongoing  Note: I/O normal  Goal: Absence of intrapartum hemorrhage signs and symptoms  Description: Absence of intrapartum hemorrhage signs and symptoms  11/16/2020 1926 by Ludivina Hill RN  Outcome: Ongoing  11/16/2020 0755 by Octavio Ordaz RN  Outcome: Ongoing  Note: No signs of extra bleeding     Problem: Infection - Intrapartum Infection:  Goal: Will show no infection signs and symptoms  Description: Will show no infection signs and symptoms  11/16/2020 1926 by Ludivina Hill RN  Outcome: Ongoing  Note: GBS positive, afebrile, antibiotics given per order   11/16/2020 0755 by Octavio Ordaz RN  Outcome: Ongoing  Note: No temps     Problem: Labor Process - Prolonged:  Goal: Uterine contractions within specified parameters  Description: Uterine contractions within specified parameters  11/16/2020 1926 by Ludivina Hill RN  Outcome: Ongoing  Note: IUPC in place contractions being monitored   11/16/2020 0755 by Octavio Ordaz RN  Outcome: Ongoing  Note: Ctx 2-3 min apart     Problem:  Screening:  Goal: Ability to make informed decisions regarding treatment has improved  Description: Ability to make informed decisions regarding treatment has improved  2020 by Milton Aldana RN  Outcome: Ongoing  Note: Pt able to make decisions regarding care  2020 by Ewa Fernandez RN  Outcome: Ongoing  Note: Pt able to make decisions     Problem: Pain - Acute:  Goal: Pain level will decrease  Description: Pain level will decrease  2020 by Milton Aldana RN  Outcome: Ongoing  Note: Pt comfortable with epidural  2020 by Ewa Fernandez RN  Outcome: Ongoing  Note: Pt comfortable with epidural, pain 0, pain goal 8     Problem: Tissue Perfusion - Uteroplacental, Altered:  Goal: Absence of abnormal fetal heart rate pattern  Description: Absence of abnormal fetal heart rate pattern  2020 by Milton Aldana RN  Outcome: Ongoing  Note: FSE in place, FHTs being monitored  2020 by Ewa Fernandez RN  Outcome: Ongoing  Note: Reactive fhts     Problem: Urinary Retention:  Goal: Urinary elimination within specified parameters  Description: Urinary elimination within specified parameters  2020 by Milton Aldana RN  Outcome: Ongoing  Note: Alfonso catheter in place draining adequate's amount of urine   2020 by Ewa Fernandez RN  Outcome: Ongoing  Note: Alfonso with clear yellow urine     Problem: Discharge Planning:  Goal: Discharged to appropriate level of care  Description: Discharged to appropriate level of care  2020 by Milton Aldana RN  Outcome: Ongoing  Note: Pt aware of 2hr recovery period in L&D and then transfer to mom/baby for the remainder of her stay.     2020 by Ewa Fernandez RN  Outcome: Ongoing  Note: Transfer to mom baby after recovery     Problem: Pain:  Goal: Pain level will decrease  Description: Pain level will decrease  11/16/2020 1926 by Gage Stafford, RN  Outcome: Ongoing  Note: Pt comfortable with epidural  11/16/2020 0755 by Leatha Cortez RN  Outcome: Ongoing  Note: Pt comfortable with epidural, pain 0, pain goal 8  Goal: Control of acute pain  Description: Control of acute pain  Outcome: Ongoing  Goal: Control of chronic pain  Description: Control of chronic pain  Outcome: Ongoing  Care plan reviewed with patient and mother. Patient and mother verbalize understanding of the plan of care and contribute to goal setting.

## 2020-11-17 NOTE — FLOWSHEET NOTE
Dr Maureen Hollis at the desk updated cervix lip/0 station, fht's with repetitive variables, Md viewed strip from the desk.

## 2020-11-17 NOTE — FLOWSHEET NOTE
Pt up to the bathroom to void for the first time. Able to ambulate and perform papito care independently.

## 2020-11-18 VITALS
OXYGEN SATURATION: 98 % | HEART RATE: 80 BPM | TEMPERATURE: 98.4 F | WEIGHT: 186 LBS | DIASTOLIC BLOOD PRESSURE: 84 MMHG | SYSTOLIC BLOOD PRESSURE: 118 MMHG | BODY MASS INDEX: 29.89 KG/M2 | HEIGHT: 66 IN | RESPIRATION RATE: 18 BRPM

## 2020-11-18 LAB
ABO: NORMAL
ANTIBODY SCREEN: NORMAL
FETAL SCREEN: NORMAL
GESTATIONAL AGE(WEEKS): NORMAL
RH FACTOR: NORMAL

## 2020-11-18 PROCEDURE — 86901 BLOOD TYPING SEROLOGIC RH(D): CPT

## 2020-11-18 PROCEDURE — 36415 COLL VENOUS BLD VENIPUNCTURE: CPT

## 2020-11-18 PROCEDURE — 86850 RBC ANTIBODY SCREEN: CPT

## 2020-11-18 PROCEDURE — 86900 BLOOD TYPING SEROLOGIC ABO: CPT

## 2020-11-18 PROCEDURE — 6360000002 HC RX W HCPCS: Performed by: OBSTETRICS & GYNECOLOGY

## 2020-11-18 PROCEDURE — 85461 HEMOGLOBIN FETAL: CPT

## 2020-11-18 PROCEDURE — 96372 THER/PROPH/DIAG INJ SC/IM: CPT

## 2020-11-18 RX ORDER — IBUPROFEN 200 MG
800 TABLET ORAL EVERY 8 HOURS PRN
COMMUNITY
Start: 2020-11-18 | End: 2022-03-30

## 2020-11-18 RX ORDER — PSEUDOEPHEDRINE HCL 30 MG
100 TABLET ORAL 2 TIMES DAILY PRN
Status: ON HOLD | COMMUNITY
Start: 2020-11-18 | End: 2021-01-18 | Stop reason: HOSPADM

## 2020-11-18 RX ADMIN — HUMAN RHO(D) IMMUNE GLOBULIN 300 MCG: 300 INJECTION, SOLUTION INTRAMUSCULAR at 09:25

## 2020-11-18 NOTE — FLOWSHEET NOTE
Postpartum education brochure given, teaching complete. [place] postpartum depression screening discussed with patient. Patient instructed to complete [facility] postpartum depression screening in 2 weeks and contact her healthcare provider if her score is > 10. Patient voiced understanding. Reviewed postpartum birth warning signs flyer with patient. Patient has voiced understanding of teaching. Mother's blood type is A-.  Baby's blood type is A+. Mother did receive Rhogam on 11-18. Discharge teaching and instructions for a vaginal delivery completed with patient using teachback method. AVS reviewed. Patient voiced understanding regarding follow up appointments, and care of self at home.

## 2020-11-18 NOTE — PLAN OF CARE
Problem: Discharge Planning:  Goal: Discharged to appropriate level of care  Description: Discharged to appropriate level of care  11/18/2020 0053 by Danya Brar RN  Outcome: Ongoing  Note: Melanie Back in  a row      Problem: Constipation:  Goal: Bowel elimination is within specified parameters  Description: Bowel elimination is within specified parameters  11/18/2020 0053 by Danya Brar RN  Outcome: Ongoing  Note: Passing gas      Problem: Fluid Volume - Imbalance:  Goal: Absence of postpartum hemorrhage signs and symptoms  Description: Absence of postpartum hemorrhage signs and symptoms  11/18/2020 0053 by Danya Brar RN  Outcome: Ongoing  Note: Negative homans      Problem: Infection - Risk of, Puerperal Infection:  Goal: Will show no infection signs and symptoms  Description: Will show no infection signs and symptoms  11/18/2020 0053 by Danya Brar RN  Outcome: Ongoing  Note: No signs of infection      Problem: Pain - Acute:  Goal: Pain level will decrease  Description: Pain level will decrease  11/18/2020 0053 by Danya Brar RN  Outcome: Ongoing  Note: Pain controlled with po meds. Discussed ice for perineal pain and/or incisional pain or the use of warm blanket/heating pad for uterine cramps. Pt states her pain goal 4/10 has been met. Problem: Falls - Risk of:  Goal: Will remain free from falls  Description: Will remain free from falls  11/18/2020 0053 by Danya Brar RN  Outcome: Ongoing  Note: Gait steady to and from bed    Plan of care discussed with mother and she contributes to goal setting and voices understanding of plan of care.

## 2020-11-18 NOTE — DISCHARGE SUMMARY
Vaginal Delivery Discharge Summary    Gestational Age:39w3d    Antepartum complications: none    Date of Delivery: 2020     Condition: Good     Type of Delivery: Vaginal     [place] Data  Information for the patient's :  Rodney Cazares [[id number]]   male   Birth Weight: 6 lb 6.7 oz (2.91 kg)       Labs: CBC   Lab Results   Component Value Date    HGB 11.8 (L) 11/15/2020    HCT 35.7 (L) 11/15/2020        Intrapartum complications: None    Postpartum complications: none    The patient is ambulating well. The patient is tolerating a normal diet.       Discharge Date: 2020    Plan:   Follow up in the office in 6 weeks    [address] [place] [address]

## 2020-11-18 NOTE — PROGRESS NOTES
Department of Obstetrics and Gynecology  Labor and Delivery  Attending Post Partum Progress Note    SUBJECTIVE:  PPD# 2    OBJECTIVE: Doing well     Vitals:  /60   Pulse 77   Temp 96.8 °F (36 °C) (Axillary)   Resp 16   Ht 5' 6\" (1.676 m)   Wt 186 lb (84.4 kg)   SpO2 98%   Breastfeeding Unknown   BMI 30.02 kg/m²     ABDOMEN:  Soft, NT, ND, fundus firm      DATA:    Hemoglobin:   Lab Results   Component Value Date    HGB 11.8 11/15/2020    HCT 35.7 11/15/2020       ASSESSMENT & PLAN:    PPD#2  - Plan D/C home today    [address] [place] [address]

## 2020-11-18 NOTE — PLAN OF CARE
Problem: Discharge Planning:  Goal: Discharged to appropriate level of care  Description: Discharged to appropriate level of care  11/18/2020 1005 by Jennifer Irvin RN  Outcome: Ongoing  Note: Plans to go home today     Problem: Constipation:  Goal: Bowel elimination is within specified parameters  Description: Bowel elimination is within specified parameters  11/18/2020 1005 by Jennifer Irvin RN  Outcome: Ongoing  Note: Encouraged fluids and fiber     Problem: Fluid Volume - Imbalance:  Goal: Absence of postpartum hemorrhage signs and symptoms  Description: Absence of postpartum hemorrhage signs and symptoms  11/18/2020 1005 by Jennifer Irvin RN  Outcome: Ongoing  Note: Minimal bleeding noted     Problem: Infection - Risk of, Puerperal Infection:  Goal: Will show no infection signs and symptoms  Description: Will show no infection signs and symptoms  11/18/2020 1005 by Jennifer Irvin RN  Outcome: Ongoing  Note: No foul smelling drainage noted     Problem: Pain - Acute:  Goal: Pain level will decrease  Description: Pain level will decrease  11/18/2020 1005 by Jennifer Irvin RN  Outcome: Ongoing  Note: Goal is 4, pt taking motrin for pain relief     Problem: Falls - Risk of:  Goal: Will remain free from falls  Description: Will remain free from falls  11/18/2020 1005 by Jennifer Irvin RN  Outcome: Ongoing  Note: No falls noted   Care plan reviewed with patient. Patient verbalize understanding of the plan of care and contribute to goal setting.

## 2021-01-13 ENCOUNTER — HOSPITAL ENCOUNTER (INPATIENT)
Age: 21
LOS: 5 days | Discharge: HOME | DRG: 751 | End: 2021-01-18
Attending: EMERGENCY MEDICINE | Admitting: PSYCHIATRY & NEUROLOGY
Payer: MEDICARE

## 2021-01-13 DIAGNOSIS — F32.9 MAJOR DEPRESSIVE DISORDER WITH CURRENT ACTIVE EPISODE, UNSPECIFIED DEPRESSION EPISODE SEVERITY, UNSPECIFIED WHETHER RECURRENT: ICD-10-CM

## 2021-01-13 DIAGNOSIS — T14.91XA SUICIDAL BEHAVIOR WITH ATTEMPTED SELF-INJURY (HCC): ICD-10-CM

## 2021-01-13 LAB
ALBUMIN SERPL-MCNC: 4.3 G/DL (ref 3.5–5.1)
ALP BLD-CCNC: 66 U/L (ref 38–126)
ALT SERPL-CCNC: 11 U/L (ref 11–66)
AMPHETAMINE+METHAMPHETAMINE URINE SCREEN: NEGATIVE
ANION GAP SERPL CALCULATED.3IONS-SCNC: 11 MEQ/L (ref 8–16)
AST SERPL-CCNC: 14 U/L (ref 5–40)
BACTERIA: ABNORMAL /HPF
BARBITURATE QUANTITATIVE URINE: NEGATIVE
BASOPHILS # BLD: 0.4 %
BASOPHILS ABSOLUTE: 0 THOU/MM3 (ref 0–0.1)
BENZODIAZEPINE QUANTITATIVE URINE: NEGATIVE
BILIRUB SERPL-MCNC: 0.4 MG/DL (ref 0.3–1.2)
BILIRUBIN DIRECT: < 0.2 MG/DL (ref 0–0.3)
BILIRUBIN URINE: ABNORMAL
BLOOD, URINE: ABNORMAL
BUN BLDV-MCNC: 9 MG/DL (ref 7–22)
CALCIUM SERPL-MCNC: 9.5 MG/DL (ref 8.5–10.5)
CANNABINOID QUANTITATIVE URINE: POSITIVE
CASTS 2: ABNORMAL /LPF
CASTS UA: ABNORMAL /LPF
CHARACTER, URINE: ABNORMAL
CHLORIDE BLD-SCNC: 99 MEQ/L (ref 98–111)
CO2: 24 MEQ/L (ref 23–33)
COCAINE METABOLITE QUANTITATIVE URINE: NEGATIVE
COLOR: ABNORMAL
CREAT SERPL-MCNC: 1 MG/DL (ref 0.4–1.2)
CRYSTALS, UA: ABNORMAL
EOSINOPHIL # BLD: 0.9 %
EOSINOPHILS ABSOLUTE: 0.1 THOU/MM3 (ref 0–0.4)
EPITHELIAL CELLS, UA: ABNORMAL /HPF
ERYTHROCYTE [DISTWIDTH] IN BLOOD BY AUTOMATED COUNT: 14 % (ref 11.5–14.5)
ERYTHROCYTE [DISTWIDTH] IN BLOOD BY AUTOMATED COUNT: 45.1 FL (ref 35–45)
GFR SERPL CREATININE-BSD FRML MDRD: 70 ML/MIN/1.73M2
GLUCOSE BLD-MCNC: 86 MG/DL (ref 70–108)
GLUCOSE URINE: NEGATIVE MG/DL
HCT VFR BLD CALC: 43.3 % (ref 37–47)
HEMOGLOBIN: 13.8 GM/DL (ref 12–16)
ICTOTEST: NEGATIVE
IMMATURE GRANS (ABS): 0.02 THOU/MM3 (ref 0–0.07)
IMMATURE GRANULOCYTES: 0.2 %
KETONES, URINE: 15
LEUKOCYTE ESTERASE, URINE: ABNORMAL
LYMPHOCYTES # BLD: 24.7 %
LYMPHOCYTES ABSOLUTE: 2.6 THOU/MM3 (ref 1–4.8)
MCH RBC QN AUTO: 28.3 PG (ref 26–33)
MCHC RBC AUTO-ENTMCNC: 31.9 GM/DL (ref 32.2–35.5)
MCV RBC AUTO: 88.9 FL (ref 81–99)
MISCELLANEOUS 2: ABNORMAL
MONOCYTES # BLD: 6.2 %
MONOCYTES ABSOLUTE: 0.7 THOU/MM3 (ref 0.4–1.3)
NITRITE, URINE: NEGATIVE
NUCLEATED RED BLOOD CELLS: 0 /100 WBC
OPIATES, URINE: NEGATIVE
OSMOLALITY CALCULATION: 266.2 MOSMOL/KG (ref 275–300)
OXYCODONE: NEGATIVE
PH UA: 5.5 (ref 5–9)
PHENCYCLIDINE QUANTITATIVE URINE: NEGATIVE
PLATELET # BLD: 386 THOU/MM3 (ref 130–400)
PMV BLD AUTO: 8.7 FL (ref 9.4–12.4)
POTASSIUM SERPL-SCNC: 3.8 MEQ/L (ref 3.5–5.2)
PREGNANCY, SERUM: NEGATIVE
PROTEIN UA: 100
RBC # BLD: 4.87 MILL/MM3 (ref 4.2–5.4)
RBC URINE: ABNORMAL /HPF
RENAL EPITHELIAL, UA: ABNORMAL
SARS-COV-2, NAAT: NOT DETECTED
SEG NEUTROPHILS: 67.6 %
SEGMENTED NEUTROPHILS ABSOLUTE COUNT: 7.1 THOU/MM3 (ref 1.8–7.7)
SODIUM BLD-SCNC: 134 MEQ/L (ref 135–145)
SPECIFIC GRAVITY, URINE: 1.03 (ref 1–1.03)
TOTAL PROTEIN: 7.6 G/DL (ref 6.1–8)
TSH SERPL DL<=0.05 MIU/L-ACNC: 2.5 UIU/ML (ref 0.4–4.2)
UROBILINOGEN, URINE: 0.2 EU/DL (ref 0–1)
WBC # BLD: 10.5 THOU/MM3 (ref 4.8–10.8)
WBC UA: ABNORMAL /HPF
YEAST: ABNORMAL

## 2021-01-13 PROCEDURE — 81001 URINALYSIS AUTO W/SCOPE: CPT

## 2021-01-13 PROCEDURE — U0002 COVID-19 LAB TEST NON-CDC: HCPCS

## 2021-01-13 PROCEDURE — 84443 ASSAY THYROID STIM HORMONE: CPT

## 2021-01-13 PROCEDURE — 1240000000 HC EMOTIONAL WELLNESS R&B

## 2021-01-13 PROCEDURE — 82248 BILIRUBIN DIRECT: CPT

## 2021-01-13 PROCEDURE — 80307 DRUG TEST PRSMV CHEM ANLYZR: CPT

## 2021-01-13 PROCEDURE — 87086 URINE CULTURE/COLONY COUNT: CPT

## 2021-01-13 PROCEDURE — 80053 COMPREHEN METABOLIC PANEL: CPT

## 2021-01-13 PROCEDURE — 6370000000 HC RX 637 (ALT 250 FOR IP): Performed by: PSYCHIATRY & NEUROLOGY

## 2021-01-13 PROCEDURE — 84703 CHORIONIC GONADOTROPIN ASSAY: CPT

## 2021-01-13 PROCEDURE — 85025 COMPLETE CBC W/AUTO DIFF WBC: CPT

## 2021-01-13 PROCEDURE — 99285 EMERGENCY DEPT VISIT HI MDM: CPT

## 2021-01-13 PROCEDURE — 36415 COLL VENOUS BLD VENIPUNCTURE: CPT

## 2021-01-13 RX ORDER — ACETAMINOPHEN 325 MG/1
650 TABLET ORAL EVERY 4 HOURS PRN
Status: DISCONTINUED | OUTPATIENT
Start: 2021-01-13 | End: 2021-01-18 | Stop reason: HOSPADM

## 2021-01-13 RX ORDER — BUPROPION HYDROCHLORIDE 150 MG/1
150 TABLET ORAL EVERY MORNING
Status: ON HOLD | COMMUNITY
End: 2021-01-18 | Stop reason: HOSPADM

## 2021-01-13 RX ORDER — NORGESTIMATE AND ETHINYL ESTRADIOL 0.25-0.035
1 KIT ORAL DAILY
COMMUNITY
End: 2022-03-30

## 2021-01-13 RX ORDER — TRAZODONE HYDROCHLORIDE 50 MG/1
50 TABLET ORAL NIGHTLY PRN
Status: DISCONTINUED | OUTPATIENT
Start: 2021-01-13 | End: 2021-01-18 | Stop reason: HOSPADM

## 2021-01-13 RX ORDER — HYDROXYZINE HYDROCHLORIDE 25 MG/1
50 TABLET, FILM COATED ORAL 3 TIMES DAILY PRN
Status: DISCONTINUED | OUTPATIENT
Start: 2021-01-13 | End: 2021-01-18 | Stop reason: HOSPADM

## 2021-01-13 RX ORDER — IBUPROFEN 200 MG
400 TABLET ORAL EVERY 6 HOURS PRN
Status: DISCONTINUED | OUTPATIENT
Start: 2021-01-13 | End: 2021-01-18 | Stop reason: HOSPADM

## 2021-01-13 RX ORDER — NORGESTIMATE AND ETHINYL ESTRADIOL 0.25-0.035
1 KIT ORAL DAILY
Status: DISCONTINUED | OUTPATIENT
Start: 2021-01-14 | End: 2021-01-18 | Stop reason: HOSPADM

## 2021-01-13 RX ORDER — MAGNESIUM HYDROXIDE/ALUMINUM HYDROXICE/SIMETHICONE 120; 1200; 1200 MG/30ML; MG/30ML; MG/30ML
30 SUSPENSION ORAL EVERY 6 HOURS PRN
Status: DISCONTINUED | OUTPATIENT
Start: 2021-01-13 | End: 2021-01-18 | Stop reason: HOSPADM

## 2021-01-13 RX ORDER — NICOTINE 21 MG/24HR
1 PATCH, TRANSDERMAL 24 HOURS TRANSDERMAL DAILY
Status: DISCONTINUED | OUTPATIENT
Start: 2021-01-13 | End: 2021-01-13

## 2021-01-13 RX ADMIN — HYDROXYZINE HYDROCHLORIDE 50 MG: 25 TABLET ORAL at 22:31

## 2021-01-13 ASSESSMENT — ENCOUNTER SYMPTOMS
SHORTNESS OF BREATH: 0
VOMITING: 0
DIARRHEA: 0
RHINORRHEA: 0
COUGH: 0
NAUSEA: 0
VOICE CHANGE: 0
WHEEZING: 0
CHEST TIGHTNESS: 0
SINUS PRESSURE: 0
ABDOMINAL PAIN: 0
TROUBLE SWALLOWING: 0
BACK PAIN: 0
CONSTIPATION: 0
SORE THROAT: 0

## 2021-01-13 ASSESSMENT — PATIENT HEALTH QUESTIONNAIRE - PHQ9
SUM OF ALL RESPONSES TO PHQ QUESTIONS 1-9: 23
SUM OF ALL RESPONSES TO PHQ QUESTIONS 1-9: 15

## 2021-01-13 ASSESSMENT — SLEEP AND FATIGUE QUESTIONNAIRES
DIFFICULTY FALLING ASLEEP: NO
DIFFICULTY ARISING: NO
DIFFICULTY FALLING ASLEEP: NO
AVERAGE NUMBER OF SLEEP HOURS: 0
RESTFUL SLEEP: NO
DIFFICULTY ARISING: NO
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
## 2021-01-13 NOTE — ED PROVIDER NOTES
[facility]        CHIEF COMPLAINT    Chief Complaint   Patient presents with    Suicidal       Nurses Notes reviewed and I agree except as noted in the HPI. HPI    Tyson Navarrete is a 21 y.o. female who presents for evaluation of of depression and suicidal thoughts. The patient states that she has depression since she was 12years old, that was 4 years ago . She states that she is about 6 weeks postpartum, the partner or the baby's father does not live with her. Patient is currently living with her mother. She is not happy, patient states that she has been suicidal.  patient was started on Wellbutrin 1 and half weeks ago by her primary care provider and seeing a counselor with me. The patient today is suicidal, she went to the bathroom and locked herself and banging her head into the bathroom and subsequently called her sister who came and helped her. Denies any hallucination or delusion,    REVIEW OF SYSTEMS    Review of Systems   Constitutional: Negative for appetite change, chills, diaphoresis, fatigue and fever. HENT: Negative for congestion, ear pain, postnasal drip, rhinorrhea, sinus pressure, sneezing, sore throat, trouble swallowing and voice change. Respiratory: Negative for cough, chest tightness, shortness of breath and wheezing. Cardiovascular: Negative for chest pain, palpitations and leg swelling. Gastrointestinal: Negative for abdominal pain, constipation, diarrhea, nausea and vomiting. Musculoskeletal: Negative for arthralgias, back pain, joint swelling, myalgias, neck pain and neck stiffness. Neurological: Negative for dizziness, syncope, weakness, light-headedness, numbness and headaches. Psychiatric/Behavioral: Positive for dysphoric mood, self-injury, sleep disturbance and suicidal ideas. PAST MEDICAL HISTORY     has no past medical history on file.     SURGICAL HISTORY has no past surgical history on file. CURRENT MEDICATIONS    Previous Medications    BUPROPION (WELLBUTRIN XL) 150 MG EXTENDED RELEASE TABLET    Take 150 mg by mouth every morning    DOCUSATE SODIUM (COLACE, DULCOLAX) 100 MG CAPS    Take 100 mg by mouth 2 times daily as needed for Constipation    IBUPROFEN (ADVIL;MOTRIN) 200 MG TABLET    Take 4 tablets by mouth every 8 hours as needed for Pain    PRENATAL MV-MIN-FE FUM-FA-DHA (PRENATAL 1 PO)    Take by mouth       ALLERGIES    has No Known Allergies. FAMILY HISTORY    She indicated that the status of her neg hx is unknown.   family history is not on file. SOCIAL HISTORY     reports that she quit smoking about 20 months ago. Her smoking use included cigarettes. She smoked 1.50 packs per day. She has never used smokeless tobacco. She reports current drug use. Drug: Marijuana. She reports that she does not drink alcohol. PHYSICAL EXAM      INITIAL VITALS: /85   Pulse 84   Temp 98.7 °F (37.1 °C) (Oral)   Resp 15   Wt 169 lb (76.7 kg)   SpO2 100%   BMI 27.28 kg/m² Estimated body mass index is 27.28 kg/m² as calculated from the following:    Height as of 11/15/20: 5' 6\" (1.676 m). Weight as of this encounter: 169 lb (76.7 kg). Physical Exam  Vitals signs reviewed. Constitutional:       Appearance: She is well-developed. HENT:      Head: Normocephalic and atraumatic. Right Ear: External ear normal.      Left Ear: External ear normal.      Nose: Nose normal.   Eyes:      General: No scleral icterus. Conjunctiva/sclera: Conjunctivae normal.      Pupils: Pupils are equal, round, and reactive to light. Neck:      Musculoskeletal: Normal range of motion and neck supple. Thyroid: No thyromegaly. Vascular: No JVD. Cardiovascular:      Rate and Rhythm: Normal rate and regular rhythm. Heart sounds: No murmur. No friction rub.    Pulmonary:      Effort: Pulmonary effort is normal. Breath sounds: Normal breath sounds. No wheezing or rales. Chest:      Chest wall: No tenderness. Abdominal:      General: Bowel sounds are normal.      Palpations: Abdomen is soft. There is no mass. Tenderness: There is no abdominal tenderness. Lymphadenopathy:      Cervical: No cervical adenopathy. Skin:     Findings: No rash. Neurological:      Mental Status: She is alert and oriented to person, place, and time. Psychiatric:         Behavior: Behavior is cooperative.          MEDICAL DECISION MAKING    DIFFERENTIAL DIAGNOSIS:  Postpartum depression, suicidal, 6 weeks postpartum      DIAGNOSTIC RESULTS    RADIOLOGY:    No orders to display       LABS:   Labs Reviewed   CBC WITH AUTO DIFFERENTIAL - Abnormal; Notable for the following components:       Result Value    MCHC 31.9 (*)     RDW-SD 45.1 (*)     MPV 8.7 (*)     All other components within normal limits   BASIC METABOLIC PANEL - Abnormal; Notable for the following components:    Sodium 134 (*)     All other components within normal limits   URINE WITH REFLEXED MICRO - Abnormal; Notable for the following components:    Bilirubin Urine SMALL (*)     Ketones, Urine 15 (*)     Blood, Urine TRACE (*)     Protein,  (*)     Leukocyte Esterase, Urine SMALL (*)     Color, UA DK YELLOW (*)     Character, Urine CLOUDY (*)     All other components within normal limits   GLOMERULAR FILTRATION RATE, ESTIMATED - Abnormal; Notable for the following components:    Est, Glom Filt Rate 70 (*)     All other components within normal limits   OSMOLALITY - Abnormal; Notable for the following components:    Osmolality Calc 266.2 (*)     All other components within normal limits   CULTURE, REFLEXED, URINE    Narrative:     Source: urine, clean catch       Site: clean void          Current Antibiotics: not stated   HEPATIC FUNCTION PANEL   HCG, SERUM, QUALITATIVE   TSH WITH REFLEX   URINE DRUG SCREEN   COVID-19   BILE ACIDS, TOTAL   ANION GAP All other unresulted laboratory test above are normal:    Vitals:    Vitals:    01/13/21 1624 01/13/21 1633 01/13/21 1854   BP: (!) 144/100  117/85   Pulse: 102  84   Resp: 18  15   Temp: 98.7 °F (37.1 °C)     TempSrc: Oral     SpO2: 100%  100%   Weight:  169 lb (76.7 kg)        EMERGENCY DEPARTMENT COURSE:    Medications   acetaminophen (TYLENOL) tablet 650 mg (has no administration in time range)   ibuprofen (ADVIL;MOTRIN) tablet 400 mg (has no administration in time range)   magnesium hydroxide (MILK OF MAGNESIA) 400 MG/5ML suspension 30 mL (has no administration in time range)   nicotine (NICODERM CQ) 14 MG/24HR 1 patch (has no administration in time range)   aluminum & magnesium hydroxide-simethicone (MAALOX) 200-200-20 MG/5ML suspension 30 mL (has no administration in time range)   hydrOXYzine (ATARAX) tablet 50 mg (has no administration in time range)   traZODone (DESYREL) tablet 50 mg (has no administration in time range)       The pt was seen and evaluated by me. Within the department, I observed the pt's vitalsigns to be within acceptable range. Laboratory studies were performed, results were reviewed with the patient. Within the department, the pt was referred to the behavioral team who evaluated the patient and relayed the case to their psychiatry. Patient was admitted to the psychiatric services. I observed the pt's condition to be hemodynamically stable during the duration of their stay. I explained my proposed course of treatment to the pt, and they were amenable to my decision. The patient is admitted to psychiatry services       CRITICAL CARE:   None. CONSULTS:  Patient is admitted to Dr. Tray Mckeon:  None. FINAL IMPRESSION       1. Post partum depression    2. Major depressive disorder with current active episode, unspecified depression episode severity, unspecified whether recurrent    3.  Suicidal behavior with attempted self-injury [facility])          DISPOSITION/PLAN PATIENT REFERRED TO: Patient is admitted to Dr. Josr Mckeon      (Please note that portions of this note were completed with a voice recognition program and electronically transcribed. Efforts were [facility] edit the dictations but occasionally words are mis-transcribed . The transcription may contain errors not detected in proofreading.   This transcription was electronically signed.)     01/13/21 8:13 PM      Cornel Gibbons MD      Emergency room physician           Cornel Gibbons MD  01/13/21 2014

## 2021-01-13 NOTE — ED NOTES
Attempted to call CPS but had to go through [facility] to be connected to CPS on call personnel. CPS got my call back number and immediately put me on hold. [facility] got back on the line and stated CPS was on the other line and will call me back shortly.       Emiliana Bloom, RN  01/13/21 8083

## 2021-01-14 PROBLEM — F33.2 SEVERE EPISODE OF RECURRENT MAJOR DEPRESSIVE DISORDER, WITHOUT PSYCHOTIC FEATURES (HCC): Status: ACTIVE | Noted: 2021-01-14

## 2021-01-14 PROCEDURE — 6360000002 HC RX W HCPCS: Performed by: PSYCHIATRY & NEUROLOGY

## 2021-01-14 PROCEDURE — 6360000002 HC RX W HCPCS

## 2021-01-14 PROCEDURE — 99223 1ST HOSP IP/OBS HIGH 75: CPT | Performed by: PSYCHIATRY & NEUROLOGY

## 2021-01-14 PROCEDURE — 1240000000 HC EMOTIONAL WELLNESS R&B

## 2021-01-14 PROCEDURE — 6370000000 HC RX 637 (ALT 250 FOR IP): Performed by: PSYCHIATRY & NEUROLOGY

## 2021-01-14 PROCEDURE — 6370000000 HC RX 637 (ALT 250 FOR IP): Performed by: REGISTERED NURSE

## 2021-01-14 RX ORDER — LORAZEPAM 2 MG/1
2 TABLET ORAL EVERY 6 HOURS PRN
Status: DISCONTINUED | OUTPATIENT
Start: 2021-01-14 | End: 2021-01-18 | Stop reason: HOSPADM

## 2021-01-14 RX ORDER — HALOPERIDOL 5 MG/ML
5 INJECTION INTRAMUSCULAR EVERY 6 HOURS PRN
Status: DISCONTINUED | OUTPATIENT
Start: 2021-01-14 | End: 2021-01-18 | Stop reason: HOSPADM

## 2021-01-14 RX ORDER — OLANZAPINE 5 MG/1
5 TABLET ORAL 2 TIMES DAILY
Status: DISCONTINUED | OUTPATIENT
Start: 2021-01-14 | End: 2021-01-15

## 2021-01-14 RX ORDER — HALOPERIDOL 5 MG
5 TABLET ORAL EVERY 6 HOURS PRN
Status: DISCONTINUED | OUTPATIENT
Start: 2021-01-14 | End: 2021-01-18 | Stop reason: HOSPADM

## 2021-01-14 RX ORDER — LORAZEPAM 2 MG/ML
INJECTION INTRAMUSCULAR
Status: COMPLETED
Start: 2021-01-14 | End: 2021-01-14

## 2021-01-14 RX ORDER — LORAZEPAM 2 MG/ML
2 INJECTION INTRAMUSCULAR EVERY 6 HOURS PRN
Status: DISCONTINUED | OUTPATIENT
Start: 2021-01-14 | End: 2021-01-18 | Stop reason: HOSPADM

## 2021-01-14 RX ADMIN — HALOPERIDOL LACTATE 5 MG: 5 INJECTION, SOLUTION INTRAMUSCULAR at 10:29

## 2021-01-14 RX ADMIN — OLANZAPINE 5 MG: 5 TABLET, FILM COATED ORAL at 21:46

## 2021-01-14 RX ADMIN — HYDROXYZINE HYDROCHLORIDE 50 MG: 25 TABLET ORAL at 21:47

## 2021-01-14 RX ADMIN — LORAZEPAM 2 MG: 2 INJECTION INTRAMUSCULAR at 10:29

## 2021-01-14 RX ADMIN — TRAZODONE HYDROCHLORIDE 50 MG: 50 TABLET ORAL at 21:47

## 2021-01-14 RX ADMIN — NORGESTIMATE AND ETHINYL ESTRADIOL 1 TABLET: KIT ORAL at 08:52

## 2021-01-14 RX ADMIN — LORAZEPAM 2 MG: 2 INJECTION INTRAMUSCULAR; INTRAVENOUS at 10:29
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
## 2021-01-14 NOTE — PROGRESS NOTES
BEHAVIORAL SERVICES: One - Hour In- Person Review  For Management of Violent or Self - Destructive Behavior    Seclusion/Restraint:  Physical Hold for emergency medication. Reason for Intervention: Patient [facility], attempting to elope, hitting herself in head and pulling her hair, threatening psychiatrist. Out of control behavior. Response to Intervention:  Voices no insight, medication was administered without harm patient or staff. Medical Record reviewed and discussed precipitating events/behaviors with RN initiating Intervention:  Yes    Patient Medical Status:  Vital Signs: refused vitals. Respiratory Status:   Circulatory Status:   Skin Integrity:    Orientation: oriented to person, place, time/date and situation    Mood/Affect: angry, anxious and labile    Speech: Loud, fast  Thought Content: suicidal    Thought Processes: Other irrational    Rationale for continued use of intervention: not continued, was a temporary hold    Rationale for discontinuing intervention: Patient is able to: to be in control of behavior, not attempt to leave and not injure herself.       One Hour Review Evaluation Physician Notification:  yes

## 2021-01-14 NOTE — ED NOTES
ED to inpatient nurses report    Chief Complaint   Patient presents with    Suicidal      Present to ED from home  LOC: alert and orientated to name, place, date  Vital signs   Vitals:    01/13/21 1624 01/13/21 1633 01/13/21 1854   BP: (!) 144/100  117/85   Pulse: 102  84   Resp: 18  15   Temp: 98.7 °F (37.1 °C)     TempSrc: Oral     SpO2: 100%  100%   Weight:  169 lb (76.7 kg)       Oxygen Baseline 100%    Current needs required room air  Bipap/Cpap No  LDAs:    Mobility: Independent  Pending ED orders: none  Present condition: stable, pt appears depressed, cooperative and poilte      Electronically signed by Amara Myers RN on 1/13/2021 at 8:27 PM       Amara Myers RN  01/13/21 2027

## 2021-01-14 NOTE — PATIENT CARE CONFERENCE
[facility]  Initial Interdisciplinary Treatment Plan NOTE    Review Date & Time: 1/14/1 1014    Patient was in treatment team.  See Multidisciplinary Treatment Team sheet for participants. Admission Type:   Admission Type: Voluntary    Reason for admission:  Reason for Admission: Major Depressive Disorder      Estimated Length of Stay Update:  3-5 days  Estimated Discharge Date Update: 3-5 days    PATIENT STRENGTHS:  Patient Strengths Strengths: Communication, Connection to output provider, Medication Compliance, Positive Support  Patient Strengths and Limitations:Limitations: Difficult relationships / poor social skills, Difficulty problem solving/relies on others to help solve problems  Addictive Behavior:Addictive Behavior  In the past 3 months, have you felt or has someone told you that you have a problem with:  : None  Do you have a history of Chemical Use?: No  Do you have a history of Alcohol Use?: No  Do you have a history of Street Drug Abuse?: No  Histroy of Prescripton Drug Abuse?: No  Medical Problems:History reviewed. No pertinent past medical history. EDUCATION:   Learner Progress Toward Treatment Goals: Reviewed results and recommendations of this team, Reviewed group plan and strategies and Reviewed goals and plan of care    Method: Individual    Outcome: Verbalized understanding and Needs reinforcement    PATIENT GOALS: Develop coping skills    PLAN/TREATMENT RECOMMENDATIONS UPDATE:   1. What is the most important thing we can help you with while you are here? Develop coping skills  2. Who is your support system? Sisters, father of her child ([address])  3. Do you have follow-up providers? Patient does telehealth therapy weekly, does not remember name of agency. 4. Do you have the ability to pay for your medications? Yes, [employer]  5.  Where will you be residing when you leave the hospital?   With mother 6. Will need a return to work slip or FMLA paper completion?    N/A      GOALS UPDATE:   Time frame for Short-Term Goals: Daily    Carles Schirmer

## 2021-01-14 NOTE — ED NOTES
Assumed care at this time. Pt resting in bed alert. Pt appears depressed and that she had been crying. Pt denied needs. PT stated she had a HA .  Sitter present Will continue to monitor      Susan Garcia RN  01/13/21 1926

## 2021-01-14 NOTE — PLAN OF CARE
Problem: Suicide risk  Goal: Provide patient with safe environment  Description: Provide patient with safe environment  Outcome: Ongoing  Note: Patient provided with a safe environment. Problem: Anger Management/Homicidal Ideation:  Goal: Able to display appropriate communication and problem solving  Description: Able to display appropriate communication and problem solving  Outcome: Ongoing  Note: Patient is not appropriate with communication and problem solving. patient became agitated today and was hitting herself in the head. Goal: Ability to verbalize frustrations and anger appropriately will improve  Description: Ability to verbalize frustrations and anger appropriately will improve  Outcome: Ongoing  Note: Patient does not appropriately verbalize her frustrations and anger. Goal: Absence of angry outbursts  Description: Absence of angry outbursts  Outcome: Ongoing  Note: Patient had 1 episode of anger today. Trying to escape unit. Making threats towards doctor and threats to hurt herself. Problem: Depressive Behavior With or Without Suicide Precautions:  Goal: Able to verbalize and/or display a decrease in depressive symptoms  Description: Able to verbalize and/or display a decrease in depressive symptoms  Outcome: Ongoing  Note: Patient's mood is irritable. Reports having anxiety and depression. Goal: Ability to disclose and discuss suicidal ideas will improve  Description: Ability to disclose and discuss suicidal ideas will improve  Outcome: Ongoing  Note: Denies suicidal thoughts. Goal: Absence of self-harm  Description: Absence of self-harm  Outcome: Ongoing  Note: Patient observed hitting herself in the head. Pulling at her hair. Goal: Patient specific goal  Description: Patient specific goal  Outcome: Ongoing  Note: No goal set today.    Goal: Participates in care planning  Description: Participates in care planning  Outcome: Ongoing Note: Care plan reviewed with patient. Patient does verbalize understanding of the plan of care and does contribute to goal setting      Problem: Discharge Planning:  Goal: Discharged to appropriate level of care  Description: Discharged to appropriate level of care  Outcome: Ongoing  Note: Discharge planning in process. Problem: Anxiety:  Goal: Level of anxiety will decrease  Description: Level of anxiety will decrease  Outcome: Ongoing  Note: Reports having anxiety. Problem: Activity:  Goal: Physical symptoms of sleep deprivation will improve  Description: Physical symptoms of sleep deprivation will improve  Outcome: Ongoing  Note: Patient slept 7.5 hours continuously last night. Goal: Sleeping patterns will improve  Description: Sleeping patterns will improve  Outcome: Ongoing     Problem: Depressive Behavior With or Without Suicide Precautions:  Goal: Able to verbalize support systems  Description: Able to verbalize support systems  Outcome: Completed  Note: Reports her family and her baby's father as her support system. Problem: Restraint Use - Violent/Self-Destructive Behavior:  Goal: Absence of restraint indications  Description: Absence of restraint indications  Outcome: Completed  Note: Patient is now relaxing in bed with eyes closed. Goal: Absence of restraint-related injury  Description: Absence of restraint-related injury  Outcome: Completed  Note: No injuries noted from physical hold.
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
## 2021-01-14 NOTE — ED NOTES
Called CPS to inform of patient admitting to reason why patient is here. CPS informed of patient sister going to resident to find patient's child, Gopi Mcmahon (:[id number]) to be safe and fastened in his carseat while patient was locked in the bathroom crying hysterically. Further story and information discussed with CPS. At this time, CPS is more concerned about the care and treatment for patient; they wanted patient to informed that she did all the proper things to keep baby safe and call another family member in to help care for the baby. Patient sister was called and informed of CPS being called. Patient sister, Gemini Madrid, very upset and not understanding why CPS had to be called. Process and protocols explained as to why CPS was given the information. Sister was still upset and wanted to talk to the charge nurse; it was explained that everyone did what was supposed to do in this situation; the baby is safe and the patient is safe. Gemini Madrid was finally able to calm down once she understood the process and explained that per CPS, a screening process would be done within 24-48 hours to determine is anything met their high risk case management. Gemini Madrid was informed that she may or may not be contacted and the main concern right now was the care and treatment for patient. Patient was updated on process and protocols and is calm and cooperative. This nurse sat with patient for awhile to hug and hold her hand while she cried. She states \"I just feel so tired and want to get better and go home to my baby\". CPS had questions regarding if the father was present and who the patient lived with, and the address of Gemini Madrid, where the baby is staying:    Patient lives with mother at the address in chart. At this time, the baby's father is not in the picture. Patient does explain that she has been trying to be a single mother and do is all herself, however, she finally reached out to her sister and is here now. Dakota Chavez is the CPS .  Her number is [phone]  Xiao's address is: [address] [place] [address]. [address]             Cl Malhotra, RN  01/13/21 [id number]

## 2021-01-14 NOTE — PLAN OF CARE
Patient has not attended any of the groups and has not been out of her room for social interaction so she has not met her socialization goal for this shift. Patient will be encouraged to attend groups on the unit daily and to come of her room to socialize with others during her hospital stay.

## 2021-01-14 NOTE — BH NOTE
Behavioral Health   Admission Note     Admission Type:   Admission Type: Voluntary    Reason for admission:  Reason for Admission: Major Depressive Disorder    PATIENT STRENGTHS:  Strengths: Communication, Connection to output provider, Medication Compliance, Positive Support    Patient Strengths and Limitations:  Limitations: Difficult relationships / poor social skills, Difficulty problem solving/relies on others to help solve problems    Addictive Behavior:   Addictive Behavior  In the past 3 months, have you felt or has someone told you that you have a problem with:  : None  Do you have a history of Chemical Use?: No  Do you have a history of Alcohol Use?: No  Do you have a history of Street Drug Abuse?: No  Histroy of Prescripton Drug Abuse?: No    Medical Problems:   History reviewed. No pertinent past medical history. Status EXAM:  Status and Exam  Normal: No  Facial Expression: Sad, Worried, Flat  Affect: Congruent, Blunt  Level of Consciousness: Alert  Mood:Normal: No  Mood: Depressed, Anxious, Sad, Irritable  Motor Activity:Normal: Yes  Interview Behavior: Cooperative, Irritable  Preception: [place] to Person, Tamiko Anabel to Time, [place] to Place, [place] to Situation  Attention:Normal: Yes  Thought Processes: Circumstantial  Thought Content:Normal: Yes  Hallucinations: None  Delusions: No  Memory:Normal: Yes  Insight and Judgment: No  Insight and Judgment: Poor Judgment, Poor Insight  Present Suicidal Ideation: No  Present Homicidal Ideation: No    Pt admitted with followings belongings:  Dentures: None  Vision - Corrective Lenses: None  Hearing Aid: None  Jewelry: Other (Comment)  Body Piercings Removed: No  Clothing:  Footwear, Shirt, Socks, Undergarments (Comment), Pants  Were All Patient Medications Collected?: Yes  Other Valuables: Cell phone     Admission order obtained yes Patient's home medications were [id number]. Patient oriented to surroundings and program expectations and copy of patient rights given. Received admission packet:  yes. Consents reviewed, signed authorizations and psychiatric rights. Patient verbalize understanding:  yes. Patient education on precautions: yes           Patient screened positive for suicide risk on CSSR-S (\"yes\" to question #4, 5, OR 6)  yes. Physician notified of risk score. Orders received yes . 2 person skin assessment completed upon admission refused. Explained patients right to have family, representative or physician notified of their admission. Patient has Declined for physician to be notified. Patient has Requested for family/representative to be notified. Pt called her sister    Provided pt with [employer] handout entitled \"Quitting Smoking. \"  Reviewed handout with pt addressing dangers of smoking, developing coping skills, and providing basic information about quitting.   Pt response to counseling:  Na Pt is a 21year old female who was brought to the unit by ER staff and campus police under a voluntary admission. Pt is tearful, flat and sad. At start of admission pt is cooperative but then the pt becomes very angry and irritable and demanding to leave and go home stating \" I am a grown person and you can not keep me her against my will, I had a bad day and that was all, I am about to go off, I want to leave now, I can not stay here, I can not stay around people, it makes me feel worse, this is not going to help this is going to make me feel worse. \" Pt then states that she was tricked into coming here, she states that the nurses in the ER told she could not leave and CPS had been called and she was afraid that CPS was going to take her baby from her if she didn't stay. Pt does admit that she had not been sleeping well and that things have been stressful with the baby father going back to work and not having the help from him. She reports she has been having issues with depression and mood swings but states she would never harm her child and does admit to making a statement about wanting to stab herself today but states she would never hurt herself and that the statement was out of frustration due to not sleeping. Pt does exhibit mood swings during admission and going from cooperative, to tearful, sobbing and then angry and demanding to leave with threatening to start going off if she is not allowed to leave. Pt sister called and states that her sisters moods have been erratic since the baby and she knows the pt would not harm the baby, and the pt is a wonderful mother. The sister states the pt was stressed tonight and had called and when she arrived the pt was in the bathroom hitting herself tonight. Pt was started on Wellbutrin 2 weeks ago and the patient states that does feels some improvement. Pt denies thoughts of harm to self or others and denies hallucinations. Pt is very fearful of losing her son. Jaimie Uli, RN
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
## 2021-01-15 LAB
ORGANISM: ABNORMAL
URINE CULTURE REFLEX: ABNORMAL

## 2021-01-15 PROCEDURE — 1240000000 HC EMOTIONAL WELLNESS R&B

## 2021-01-15 PROCEDURE — 6370000000 HC RX 637 (ALT 250 FOR IP): Performed by: REGISTERED NURSE

## 2021-01-15 PROCEDURE — 6370000000 HC RX 637 (ALT 250 FOR IP): Performed by: PSYCHIATRY & NEUROLOGY

## 2021-01-15 PROCEDURE — 99232 SBSQ HOSP IP/OBS MODERATE 35: CPT | Performed by: PSYCHIATRY & NEUROLOGY

## 2021-01-15 RX ADMIN — NORGESTIMATE AND ETHINYL ESTRADIOL 1 TABLET: KIT ORAL at 09:09

## 2021-01-15 RX ADMIN — OLANZAPINE 5 MG: 5 TABLET, FILM COATED ORAL at 09:09

## 2021-01-15 RX ADMIN — TRAZODONE HYDROCHLORIDE 50 MG: 50 TABLET ORAL at 20:28

## 2021-01-15 RX ADMIN — OLANZAPINE 7.5 MG: 2.5 TABLET, FILM COATED ORAL at 20:27

## 2021-01-15 ASSESSMENT — PAIN - FUNCTIONAL ASSESSMENT: PAIN_FUNCTIONAL_ASSESSMENT: ACTIVITIES ARE NOT PREVENTED

## 2021-01-15 NOTE — PLAN OF CARE
Problem: Suicide risk  Goal: Provide patient with safe environment  Description: Provide patient with safe environment  Outcome: Ongoing  Note: Patient provided with safe environment       Problem: Anger Management/Homicidal Ideation:  Goal: Able to display appropriate communication and problem solving  Description: Able to display appropriate communication and problem solving  Outcome: Ongoing  Note: Patient unable to display appropriate communication and problem  solving   Goal: Ability to verbalize frustrations and anger appropriately will improve  Description: Ability to verbalize frustrations and anger appropriately will improve  Outcome: Ongoing  Note: Patient unable to verbalize frustrations and anger appropriately will improve   Goal: Absence of angry outbursts  Description: Absence of angry outbursts  Outcome: Ongoing  Note: Patient has had multiple angry outbursts       Problem: Depressive Behavior With or Without Suicide Precautions:  Goal: Able to verbalize and/or display a decrease in depressive symptoms  Description: Able to verbalize and/or display a decrease in depressive symptoms  Outcome: Ongoing  Note: Patient unable to verbalize and display a decrease in depressive symptoms   Goal: Ability to disclose and discuss suicidal ideas will improve  Description: Ability to disclose and discuss suicidal ideas will improve  Outcome: Ongoing  Note: Patient unable to disclose and discuss suicidal ideas will improve   Goal: Absence of self-harm  Description: Absence of self-harm  Outcome: Ongoing  Note: No self harm behaviors were observed or reported so far this shift. Remains on every 15 minutes precautions for safety.    Goal: Patient specific goal  Description: Patient specific goal  Outcome: Ongoing  Note: Patient state specific goal this shift   Goal: Participates in care planning  Description: Participates in care planning  Outcome: Ongoing  Note: Patient did participate in care planning Care plan reviewed with patient and   Patient and  verbalize understanding of the plan of care and contribute to goal setting.

## 2021-01-15 NOTE — PLAN OF CARE
Problem: Suicide risk  Goal: Provide patient with safe environment  Description: Provide patient with safe environment  1/15/2021 0022 by Pauline Chowdary RN  Outcome: Ongoing  Note: Pt remained safe and free of harm  1/14/2021 1311 by All Siddiqui RN  Outcome: Ongoing  Note: Patient provided with a safe environment. Problem: Anger Management/Homicidal Ideation:  Goal: Able to display appropriate communication and problem solving  Description: Able to display appropriate communication and problem solving  1/15/2021 0022 by Pauline Chowdary RN  Outcome: Ongoing  Note: Pt calm and cooperative with assessment  1/14/2021 1311 by All Siddiqui RN  Outcome: Ongoing  Note: Patient is not appropriate with communication and problem solving. patient became agitated today and was hitting herself in the head. Goal: Ability to verbalize frustrations and anger appropriately will improve  Description: Ability to verbalize frustrations and anger appropriately will improve  1/15/2021 0022 by Pauline Chowdary RN  Outcome: Ongoing  Note: Pt calmly stressed her frustration with being hospitalized and did not get angry or threatening  1/14/2021 1311 by All Siddiqui RN  Outcome: Ongoing  Note: Patient does not appropriately verbalize her frustrations and anger. Goal: Absence of angry outbursts  Description: Absence of angry outbursts  1/15/2021 0022 by Pauline Chowdary RN  Outcome: Ongoing  Note: Pt remained calm and cooperative  1/14/2021 1311 by All Siddiqui RN  Outcome: Ongoing  Note: Patient had 1 episode of anger today. Trying to escape unit. Making threats towards doctor and threats to hurt herself.       Problem: Depressive Behavior With or Without Suicide Precautions:  Goal: Able to verbalize and/or display a decrease in depressive symptoms  Description: Able to verbalize and/or display a decrease in depressive symptoms  1/15/2021 0022 by Pauline Chowdary RN  Outcome: Ongoing Note: Pt denies depression or anxiety accept from being away from her baby  1/14/2021 1311 by Cecille Gao RN  Outcome: Ongoing  Note: Patient's mood is irritable. Reports having anxiety and depression. Goal: Ability to disclose and discuss suicidal ideas will improve  Description: Ability to disclose and discuss suicidal ideas will improve  1/15/2021 0022 by Radha Holland RN  Outcome: Ongoing  Note: Pt denies self harm thoughts  1/14/2021 1311 by Cecille Gao RN  Outcome: Ongoing  Note: Denies suicidal thoughts. Goal: Absence of self-harm  Description: Absence of self-harm  1/15/2021 0022 by Radha Holland RN  Outcome: Ongoing  Note: Pt remained safe and free of harm  1/14/2021 1311 by Cecille Gao RN  Outcome: Ongoing  Note: Patient observed hitting herself in the head. Pulling at her hair. Goal: Participates in care planning  Description: Participates in care planning  1/15/2021 0022 by Radha Holland RN  Outcome: Ongoing  Note: Pt took her medication, cooperative with assessment but did not attend group or socialize  1/14/2021 1311 by Cecille Gao RN  Outcome: Ongoing  Note: Care plan reviewed with patient. Patient does verbalize understanding of the plan of care and does contribute to goal setting      Problem: Discharge Planning:  Goal: Discharged to appropriate level of care  Description: Discharged to appropriate level of care  1/15/2021 0022 by Radha Holland RN  Outcome: Ongoing  Note: Pt to be discharged home and plans to follow with her PCP and counselor  1/14/2021 1311 by Cecille Gao RN  Outcome: Ongoing  Note: Discharge planning in process. Problem: Anxiety:  Goal: Level of anxiety will decrease  Description: Level of anxiety will decrease  1/15/2021 0022 by Radha Holland RN  Outcome: Ongoing  Note: Pt reports her anxiety is from missing her baby  1/14/2021 1311 by Cecille Gao RN  Outcome: Ongoing  Note: Reports having anxiety.

## 2021-01-15 NOTE — PROGRESS NOTES
This RN has reviewed and agrees with GAYLA Wong LPN's data collection and has collaborated with this LPN regarding the patient's care plan.

## 2021-01-15 NOTE — PROGRESS NOTES
LORazepam (ATIVAN) tablet 2 mg, 2 mg, Oral, Q6H PRN  haloperidol lactate (HALDOL) injection 5 mg, 5 mg, Intramuscular, Q6H PRN  LORazepam (ATIVAN) injection 2 mg, 2 mg, Intramuscular, Q6H PRN  OLANZapine (ZYPREXA) tablet 5 mg, 5 mg, Oral, BID  acetaminophen (TYLENOL) tablet 650 mg, 650 mg, Oral, Q4H PRN  ibuprofen (ADVIL;MOTRIN) tablet 400 mg, 400 mg, Oral, Q6H PRN  magnesium hydroxide (MILK OF MAGNESIA) 400 MG/5ML suspension 30 mL, 30 mL, Oral, Daily PRN  aluminum & magnesium hydroxide-simethicone (MAALOX) 200-200-20 MG/5ML suspension 30 mL, 30 mL, Oral, Q6H PRN  hydrOXYzine (ATARAX) tablet 50 mg, 50 mg, Oral, TID PRN  traZODone (DESYREL) tablet 50 mg, 50 mg, Oral, Nightly PRN  norgestimate-ethinyl estradiol (ORTHO-CYCLEN) 0.25-35 MG-MCG per tablet 1 tablet, 1 tablet, Oral, Daily     Physical     height is 5' 6\" (1.676 m) and weight is 164 lb 4 oz (74.5 kg). Her oral temperature is 97.7 °F (36.5 °C). Her blood pressure is 118/63 and her pulse is 96. Her respiration is 16 and oxygen saturation is 96%.    Lab Results   Component Value Date    WBC 10.5 01/13/2021    HGB 13.8 01/13/2021    HCT 43.3 01/13/2021     01/13/2021    ALT 11 01/13/2021    AST 14 01/13/2021     (L) 01/13/2021    K 3.8 01/13/2021    CL 99 01/13/2021    CREATININE 1.0 01/13/2021    BUN 9 01/13/2021    CO2 24 01/13/2021    TSH 2.500 01/13/2021          Mental Status Exam:   Level of consciousness:  within normal limits  Appearance:  well-appearing and street clothes, sitting in bed  Behavior/Motor:  no abnormalities noted  Attitude toward examiner:  cooperative, attentive and good eye contact  Speech:  spontaneous, normal rate, normal volume and well articulated  Mood:  \"okay\"  Affect:  mood congruent and blunted  Thought processes:  linear, goal directed and coherent  Thought content:  Denies homicidal ideation  Suicidal Ideation:  denies suicidal ideation  Delusions:  no evidence of delusions

## 2021-01-15 NOTE — PROGRESS NOTES
Per ED RN, Adalgisa Ortega note on 21 at 7:28 PM: \"Called CPS to inform of patient admitting to reason why patient is here. CPS informed of patient sister going to resident to find patient's child, Deborah Garner (:20) to be safe and fastened in his carseat while patient was locked in the bathroom crying hysterically. Further story and information discussed with CPS. At this time, CPS is more concerned about the care and treatment for patient; they wanted patient to informed that she did all the proper things to keep baby safe and call another family member in to help care for the baby.      Patient sister was called and informed of CPS being called. Patient sister, Alfredo Nuno, very upset and not understanding why CPS had to be called. Process and protocols explained as to why CPS was given the information. Sister was still upset and wanted to talk to the charge nurse; it was explained that everyone did what was supposed to do in this situation; the baby is safe and the patient is safe. Alfredo Nuno was finally able to calm down once she understood the process and explained that per CPS, a screening process would be done within 24-48 hours to determine is anything met their high risk case management. Alfredo Nuno was informed that she may or may not be contacted and the main concern right now was the care and treatment for patient.      Patient was updated on process and protocols and is calm and cooperative. This nurse sat with patient for awhile to hug and hold her hand while she cried.  She states \"I just feel so tired and want to get better and go home to my baby\".      CPS had questions regarding if the father was present and who the patient lived with, and the address of Alfredo Nuno, where the baby is staying:

## 2021-01-15 NOTE — PLAN OF CARE
Patient has not attended any groups so far today and has not been out of her room to socialize with others so she has not met her socialization goal. Patient will be encouraged to attend all groups on the unit daily and to come out of her room to socialize with others during the rest of her hospital stay.

## 2021-01-16 PROCEDURE — APPSS15 APP SPLIT SHARED TIME 0-15 MINUTES: Performed by: NURSE PRACTITIONER

## 2021-01-16 PROCEDURE — 99232 SBSQ HOSP IP/OBS MODERATE 35: CPT | Performed by: PSYCHIATRY & NEUROLOGY

## 2021-01-16 PROCEDURE — 6370000000 HC RX 637 (ALT 250 FOR IP): Performed by: PSYCHIATRY & NEUROLOGY

## 2021-01-16 PROCEDURE — 90833 PSYTX W PT W E/M 30 MIN: CPT | Performed by: PSYCHIATRY & NEUROLOGY

## 2021-01-16 PROCEDURE — 1240000000 HC EMOTIONAL WELLNESS R&B

## 2021-01-16 RX ADMIN — OLANZAPINE 7.5 MG: 2.5 TABLET, FILM COATED ORAL at 08:39

## 2021-01-16 RX ADMIN — OLANZAPINE 7.5 MG: 2.5 TABLET, FILM COATED ORAL at 20:20

## 2021-01-16 RX ADMIN — NORGESTIMATE AND ETHINYL ESTRADIOL 1 TABLET: KIT ORAL at 08:39

## 2021-01-16 RX ADMIN — TRAZODONE HYDROCHLORIDE 50 MG: 50 TABLET ORAL at 20:20

## 2021-01-16 ASSESSMENT — PAIN DESCRIPTION - PROGRESSION: CLINICAL_PROGRESSION: NOT CHANGED

## 2021-01-16 ASSESSMENT — PAIN DESCRIPTION - FREQUENCY: FREQUENCY: INTERMITTENT

## 2021-01-16 NOTE — PROGRESS NOTES
Group Therapy Note     Date: 1/16/2021  Start Time: 0900  End Time:  0930  Number of Participants: 12     Type of Group: Community Meeting        Patient's Goal:  Patient refused goal     Notes:  Patient refused to attend group.              Discipline Responsible: Licensed Practical Nurse        Signature:  Linus Prince.  Piper Lock LPN

## 2021-01-16 NOTE — PROGRESS NOTES
[id number] Clinician made rounds. Clinician introduced self and encouraged patient to check in with needs, questions, or concerns as they arise with . 1011 Discharge Planning: Patient would like to be connected to [facility]. Patient facesheet placed in [facility] box.

## 2021-01-16 NOTE — PLAN OF CARE
Description: Ability to identify problematic behaviors that deter socialization will improve  1/16/2021 1250 by Annalee Alexander RN  Outcome: Ongoing  1/16/2021 0056 by Savannah Kaplan RN  Outcome: Ongoing  Note: Ongoing. Problem: Role Relationship:  Goal: Ability to verbalize awareness of feelings that lead to decreased social interaction will improve  Description: Ability to verbalize awareness of feelings that lead to decreased social interaction will improve  1/16/2021 1250 by Annalee Alexander RN  Outcome: Ongoing  1/16/2021 0056 by Savannah Kaplan RN  Outcome: Ongoing  Note: Ongoing. Problem: Safety:  Goal: Risk of elopement will decrease  Description: Risk of elopement will decrease  1/16/2021 1250 by Annalee Alexander RN  Outcome: Ongoing  Note: Patient has not verbalized any thoughts of elopement. 1/16/2021 0056 by Savannah Kaplan RN  Outcome: Ongoing  Note: No attempts at elopement noted. Problem: Suicide risk  Goal: Provide patient with safe environment  Description: Provide patient with safe environment  1/16/2021 0104 by Savannah Kaplna RN  Outcome: Completed  1/16/2021 0056 by Savannah Kaplan RN  Outcome: Ongoing  Note: Patient maintained in a safe environment. 15 minute visual checks continued. Problem: Anger Management/Homicidal Ideation:  Goal: Able to display appropriate communication and problem solving  Description: Able to display appropriate communication and problem solving  1/16/2021 0104 by Savannah Kaplan RN  Outcome: Completed  1/16/2021 0056 by Savannah Kaplan RN  Outcome: Ongoing  Note: Patient noted with appropriate interaction with staff. Problem: Activity:  Goal: Physical symptoms of sleep deprivation will improve  Description: Physical symptoms of sleep deprivation will improve  1/16/2021 0104 by Savannah Kaplan RN  Outcome: Completed  1/16/2021 0056 by Savannah Kaplan RN  Outcome: Ongoing  Note: Ongoing.

## 2021-01-16 NOTE — BH NOTE
Patient's sister into visit with patient's 5 week old son. Good interaction together. Patient held her baby, feed him and changed his diaper.

## 2021-01-16 NOTE — BH NOTE
Nurse spoke with patient's sister this morning stating that she has not talked with patient since being admitted. Sister states that her and the rest of the family do plan on helping her with the baby. Patient is much more calm and cooperative with nurse then on Thursday. Sister states that it is normal for patient to not eat much during the day. Yesterday patient only ate a snack at bedtime.

## 2021-01-16 NOTE — PROGRESS NOTES
Psychiatry Progress Note      CC: Severe episode of recurrent major depressive disorder, without psychotic features ([facility] [address]      Subjective    Progress:  Joseph reports feeling better since admission. Reports depression is less. Denies feelings of harm towards self or others. States she never wanted to hurt herself but just said something she did not mean. States med she was started on (Zyprexa) seems to be helping. Denies having side effects from med. Good med compliance is verified. Repors appetite is good and reports slept well last night. Verified slept 8.5 hours continuous. Denies going to groups. States she doesn't like being around groups of people. Denies getting any visits. But reports has been talking to sister and her baby's father on phone. Objective  /67   Pulse 121   Temp 97.1 °F (36.2 °C) (Tympanic)   Resp 18   Ht 5' 6\" (1.676 m)   Wt 164 lb 4 oz (74.5 kg)   SpO2 98%   BMI 26.51 kg/m²      MSE:  Level of consciousness: Alert  Appearance: hospital attire, in chair and fair grooming   Behavior/Motor: no abnormalities noted   Attitude toward examiner: cooperative   Speech: Normal volume, goal directed, NRR  Mood: Euthymic  Affect: Reactive  Thought processes: Linear and goal directed   Suicidal Ideation: Denies suicidal ideations  Homicidal ideation: Denies homicidal ideations  Delusions: No evidence of delusions is observed  Perceptual Disturbance: Denies AH/VH;  No evidence of psychosis is observed. Cognition: Oriented to person, place, time and situation   Concentration fair   Memory intact   Insight: Poor   Judgment: Poor    Assessment:  Severe episode of recurrent major depressive disorder, without psychotic features ([facility] [address] [address]      Plan:  Continue current meds as ordered  Continue to encourage group attendance.       Rosmery Wei, MARIAELENA  7-                                     Psychiatry Attending Attestation I assessed this patient and reviewed the case and plan of care with Melva Gordillo CNP. I have reviewed the above documentation and I agree with the findings and treatment plan with the following updates. Patient was seen by Melva Gordillo CNPon the unit and I evaluated patient as Tele visit. Patient continues to be very irritable and discharge focused this morning. However she is not making any specific threats. She is able to contain her anger and emotions. Reports she is sleeping better. Staff mentioned that she had a good visit with her son today evening. Will discharge her on Monday morning if she continues to improve. More than 16 mins of the session was spent doing Supportive psychotherapy and coordinating care. Session lasted for over 30 mins. Tyson Navarrete is a 21 y.o. female being evaluated by a Virtual Visit (video visit) encounter to address concerns as mentioned above. A caregiver was present in the room along with the patient. Pursuant to the emergency declaration under the [facility], [address] [place] [address] and the [employer] and [employer] Act, this Virtual Visit was conducted with patient's (and/or legal guardian's) consent, to reduce the patient's risk of exposure to COVID-19 and provide necessary medical care. Services were provided through a video synchronous discussion virtually to substitute for in-person visit by provider. Patient is present at [address] [place] [address] [address] and I am physically present at my home in [facility]     --Obi Bolanos MD on 1/16/2021 at 7:51 PM    An electronic signature was used to authenticate this note. **This report has been created using voice recognition software. It may contain minor errors which are inherent in voice recognition technology. **  \

## 2021-01-16 NOTE — GROUP NOTE
Group Therapy Note    Date: 1/16/2021    Group Start Time: 1400  Group End Time: 1500  Group Topic: Psychotherapy    [facility] 4E    BELIA Ahuja LSW        Group Therapy Note    Attendees: 11         Patient's Goal:  Patient goal was to identify feelings in regards to good and bad days. Patient matched feelings with actions or reactions. Patient's discussed readiness to leave the facility and what they have found works for them. Notes: Patient participated and actively shared. Status After Intervention:  Improved    Participation Level:  Active Listener    Participation Quality: Appropriate, Attentive, Sharing and Supportive      Speech:  normal      Thought Process/Content: Linear      Affective Functioning: Congruent      Mood: euthymic      Level of consciousness:  Alert, Oriented x4 and Attentive      Response to Learning: Able to verbalize current knowledge/experience, Able to verbalize/acknowledge new learning, Able to retain information, Capable of insight, Able to change behavior and Progressing to goal      Endings: None Reported    Modes of Intervention: Education, Support, Socialization, Exploration and Clarifying      Discipline Responsible: /Counselor      Signature:  BELIA Ahuja LSW

## 2021-01-16 NOTE — PLAN OF CARE
Problem: Suicide risk  Goal: Provide patient with safe environment  Description: Provide patient with safe environment  1/16/2021 0056 by Theodore Ma RN  Outcome: Ongoing  Note: Patient maintained in a safe environment. 15 minute visual checks continued. 1/15/2021 1437 by Aj Bella LPN  Outcome: Ongoing  Note: Patient provided with safe environment       Problem: Anger Management/Homicidal Ideation:  Goal: Able to display appropriate communication and problem solving  Description: Able to display appropriate communication and problem solving  1/16/2021 0056 by Theodore Ma RN  Outcome: Ongoing  Note: Patient noted with appropriate interaction with staff. 1/15/2021 1437 by Aj Bella LPN  Outcome: Ongoing  Note: Patient unable to display appropriate communication and problem  solving   Goal: Ability to verbalize frustrations and anger appropriately will improve  Description: Ability to verbalize frustrations and anger appropriately will improve  1/16/2021 0056 by Theodore Ma RN  Outcome: Ongoing  Note: Patient denies any anxiety or frustrations this shift. 1/15/2021 1437 by Aj Bella LPN  Outcome: Ongoing  Note: Patient unable to verbalize frustrations and anger appropriately will improve   Goal: Absence of angry outbursts  Description: Absence of angry outbursts  1/16/2021 0056 by Theodore Ma RN  Outcome: Ongoing  Note: None noted so far this shift. 1/15/2021 1437 by Aj Bella LPN  Outcome: Ongoing  Note: Patient has had multiple angry outbursts       Problem: Depressive Behavior With or Without Suicide Precautions:  Goal: Able to verbalize and/or display a decrease in depressive symptoms  Description: Able to verbalize and/or display a decrease in depressive symptoms  1/16/2021 0056 by Theodore Ma RN  Outcome: Ongoing  Note: Patient denies any feelings of depression this shift. 1/15/2021 1437 by Ruby Diaz LPN  Outcome: Ongoing  Note: Patient unable to verbalize and display a decrease in depressive symptoms   Goal: Ability to disclose and discuss suicidal ideas will improve  Description: Ability to disclose and discuss suicidal ideas will improve  1/16/2021 0056 by Rosa Bruossard RN  Outcome: Ongoing  Note: Patient denies any suicidal thoughts so far this shift. 1/15/2021 1437 by Ruby Diaz LPN  Outcome: Ongoing  Note: Patient unable to disclose and discuss suicidal ideas will improve   Goal: Absence of self-harm  Description: Absence of self-harm  1/16/2021 0056 by Rosa Broussard RN  Outcome: Ongoing  Note: None noted so far this shift. 1/15/2021 1437 by Ruby Diaz LPN  Outcome: Ongoing  Note: No self harm behaviors were observed or reported so far this shift. Remains on every 15 minutes precautions for safety. Goal: Patient specific goal  Description: Patient specific goal  1/16/2021 0056 by Rosa Broussard RN  Outcome: Ongoing  Note: Patient stated her goal is to be discharged. 1/15/2021 1437 by Ruby Diaz LPN  Outcome: Ongoing  Note: Patient state specific goal this shift   Goal: Participates in care planning  Description: Participates in care planning  1/16/2021 0056 by Rosa Broussard RN  Outcome: Ongoing  Note: Care plan reviewed with patient and fair understanding verbalized. 1/15/2021 1437 by Ruby Diaz LPN  Outcome: Ongoing  Note: Patient did participate in care planning      Problem: Discharge Planning:  Goal: Discharged to appropriate level of care  Description: Discharged to appropriate level of care  1/16/2021 0056 by Rosa Broussard RN  Outcome: Ongoing  Note: Patient states she plans to return home with her mother at discharge.   1/15/2021 1437 by Ruby Diaz LPN  Outcome: Ongoing

## 2021-01-16 NOTE — BH NOTE
Group Therapy Note    Date: 1/15/2021  Start Time: 2000  End Time:  2020  Number of Participants: 1    Type of Group: Wrap-Up/Relaxation    Wellness Binder Information  Module Name:  None  Session Number:  None    Patient's Goal:  To be discharged    Notes:  Ongoing    Status After Intervention:  Unchanged    Participation Level: Interactive    Participation Quality: Attentive      Speech:  hesitant      Thought Process/Content: Logical      Affective Functioning: Blunted      Mood: anxious      Level of consciousness:  Oriented x4      Response to Learning: Capable of insight      Endings: None Reported    Modes of Intervention: Education      Discipline Responsible: Registered Nurse      Signature:   Dank Corona RN

## 2021-01-17 PROCEDURE — 90833 PSYTX W PT W E/M 30 MIN: CPT | Performed by: PSYCHIATRY & NEUROLOGY

## 2021-01-17 PROCEDURE — 99232 SBSQ HOSP IP/OBS MODERATE 35: CPT | Performed by: PSYCHIATRY & NEUROLOGY

## 2021-01-17 PROCEDURE — 6370000000 HC RX 637 (ALT 250 FOR IP): Performed by: PSYCHIATRY & NEUROLOGY

## 2021-01-17 PROCEDURE — APPSS15 APP SPLIT SHARED TIME 0-15 MINUTES: Performed by: NURSE PRACTITIONER

## 2021-01-17 PROCEDURE — 1240000000 HC EMOTIONAL WELLNESS R&B

## 2021-01-17 RX ORDER — NICOTINE 21 MG/24HR
1 PATCH, TRANSDERMAL 24 HOURS TRANSDERMAL DAILY
Status: DISCONTINUED | OUTPATIENT
Start: 2021-01-17 | End: 2021-01-18 | Stop reason: HOSPADM

## 2021-01-17 RX ADMIN — OLANZAPINE 7.5 MG: 2.5 TABLET, FILM COATED ORAL at 09:14

## 2021-01-17 RX ADMIN — NORGESTIMATE AND ETHINYL ESTRADIOL 1 TABLET: KIT ORAL at 09:14

## 2021-01-17 RX ADMIN — OLANZAPINE 7.5 MG: 2.5 TABLET, FILM COATED ORAL at 20:32

## 2021-01-17 RX ADMIN — TRAZODONE HYDROCHLORIDE 50 MG: 50 TABLET ORAL at 20:33

## 2021-01-17 ASSESSMENT — PAIN SCALES - GENERAL: PAINLEVEL_OUTOF10: 0

## 2021-01-17 NOTE — PROGRESS NOTES
Psychiatry Progress Note        CC: Severe episode of recurrent major depressive disorder, without psychotic features (HCC)      Subjective     Progress:  Joseph reports feeling much better today. Reports depression is a lot  less. Denies feelings of harm towards self or others. Reports the Zyprexa seems to be helping her mood. Denies having side effects from med. Good med compliance is verified. Repors appetite is good and reports slept well again  last night. Verified slept 8.5 hours continuous. States she did attend groups yesterday. . Reports she talked with her boyfriend on phone yesterday and sister visited with her daughter. Reports visit went really well.      Objective  /85   Pulse 81   Temp 97 °F (36.1 °C) (Tympanic)   Resp 16   Ht 5' 6\" (1.676 m)   Wt 164 lb 4 oz (74.5 kg)   SpO2 98%   BMI 26.51 kg/m²       MSE:  Level of consciousness: Alert  Appearance: hospital attire, in chair and fair grooming   Behavior/Motor: no abnormalities noted   Attitude toward examiner: cooperative   Speech: Normal volume, goal directed, NRR  Mood: Euthymic  Affect: Reactive  Thought processes: Linear and goal directed   Suicidal Ideation: Denies suicidal ideations  Homicidal ideation: Denies homicidal ideations  Delusions: No evidence of delusions is observed  Perceptual Disturbance: Denies AH/VH;  No evidence of psychosis is observed.   Cognition: Oriented to person, place, time and situation   Concentration fair   Memory intact   Insight: Improved  Judgment: Improved     Assessment:  Severe episode of recurrent major depressive disorder, without psychotic features (HCC)      Plan:  Continue current meds as ordered  Continue to encourage group attendance.        Gwendel Ahumada, CNP  1-                                   Psychiatry Attending Attestation I assessed this patient and reviewed the case and plan of care with Gregory Arrington CNP. I have reviewed the above documentation and I agree with the findings and treatment plan with the following updates. Patient was seen by Gregory Arrington CNPon the unit and I evaluated patient as Tele visit. Patient feels better than before. Mood and affect are better. Patient denies any suicidal ideations. No aggression or irritability noted in last 36 hrs. She is able to manage her impulsivity, easily redirectable. Staff mentioned she is attending all groups  Denies any homicidal thoughts, that was explored with the patient. Oriented to time place and person. Recent and remote memory is intact. Patient feels hopeful. Sleep and appetite is good. No side effect from medication reported. Side-effect of medication were discussed with the patient . Patient is responding to current treatment. Discharge soon, if patient continues to show improvement. Case discussed with the staff. More than 16 mins of the session was spent doing Supportive psychotherapy and coordinating care. Session lasted for over 30 mins. Marifer Smith is a 21 y.o. female being evaluated by a Virtual Visit (video visit) encounter to address concerns as mentioned above. A caregiver was present in the room along with the patient. Pursuant to the emergency declaration under the [facility], [address] [place] [address] authority and the [employer] and [employer] Act, this Virtual Visit was conducted with patient's (and/or legal guardian's) consent, to reduce the patient's risk of exposure to COVID-19 and provide necessary medical care. Services were provided through a video synchronous discussion virtually to substitute for in-person visit by provider.    Patient is present at [address] [place] [address] [address] [facility] and I am physically present at my home in [facility] [place] --John Love MD on 1/17/2021 at 8:44 PM    An electronic signature was used to authenticate this note. **This report has been created using voice recognition software. It may contain minor errors which are inherent in voice recognition technology. **

## 2021-01-17 NOTE — GROUP NOTE
Group Therapy Note    Date: 1/17/2021    Group Start Time: 1330  Group End Time: 0  Group Topic: Psychotherapy    [facility]    Gwynne Bumpers, LSW; BELIA Cornelius, NORMA        Group Therapy Note    Attendees:       Patient's Goal:  To build patient relationships exploring empowerment cards    Notes:  Patient actively participated and shared answers to question cards. Patient joked with others and challenged other patients to answer questions and join the conversation. Status After Intervention:  Improved    Participation Level:  Active Listener and Interactive    Participation Quality: Appropriate, Attentive and Sharing      Speech:  normal      Thought Process/Content: Logical  Linear      Affective Functioning: Congruent      Mood: euphoric      Level of consciousness:  Alert, Oriented x4 and Attentive      Response to Learning: Able to verbalize current knowledge/experience, Able to verbalize/acknowledge new learning, Able to retain information, Capable of insight, Able to change behavior and Progressing to goal      Endings: None Reported    Modes of Intervention: Education, Support, Socialization, Exploration and Activity      Discipline Responsible: /Counselor      Signature:  Gwynne Bumpers, LSW

## 2021-01-17 NOTE — GROUP NOTE
Group Therapy Note    Date: 1/17/2021    Group Start Time: 1000  Group End Time: 1030  Group Topic: [employer]    [facility] 4E    CHARANJIT Walker    Group Therapy Note    Attendees: 6         Patient's Goal:  \"Try to control my outbursts. \"    Notes:  Pt progress is ongoing. Status After Intervention:  Improved    Participation Level:  Active Listener and Interactive    Participation Quality: Appropriate, Attentive and Sharing      Speech:  normal      Thought Process/Content: Linear      Affective Functioning: Flat      Mood: euthymic      Level of consciousness:  Alert, Oriented x4 and Attentive      Response to Learning: Able to verbalize current knowledge/experience, Able to verbalize/acknowledge new learning and Progressing to goal      Endings: None Reported    Modes of Intervention: Education, Support, Socialization, Exploration, Clarifying, Activity, Limit-setting and Reality-testing      Discipline Responsible: Psychoeducational Specialist      Signature:  CHARANJIT Curran

## 2021-01-17 NOTE — PLAN OF CARE
Problem: Anger Management/Homicidal Ideation:  Goal: Ability to verbalize frustrations and anger appropriately will improve  Description: Ability to verbalize frustrations and anger appropriately will improve  Outcome: Met This Shift  Note: Patient able to appropriately verbalize any frustrations. Goal: Absence of angry outbursts  Description: Absence of angry outbursts  Outcome: Met This Shift  Note: Free from any anger outburst so far this shift. Problem: Depressive Behavior With or Without Suicide Precautions:  Goal: Able to verbalize and/or display a decrease in depressive symptoms  Description: Able to verbalize and/or display a decrease in depressive symptoms  Outcome: Ongoing  Rates her mood 8 out of 10 with 10 being the best mood. Denies anxiety and depression. Goal: Ability to disclose and discuss suicidal ideas will improve  Description: Ability to disclose and discuss suicidal ideas will improve  Outcome: Ongoing  Denies suicidal thoughts. Goal: Absence of self-harm  Description: Absence of self-harm  Outcome: Ongoing  Free from self harming behaviors. Goal: Patient specific goal  Description: Patient specific goal  Outcome: Ongoing  \"Try to control my outburst.\"  Goal: Participates in care planning  Description: Participates in care planning  Outcome: Ongoing  Care plan reviewed with patient. Patient does verbalize understanding of the plan of care and does contribute to goal setting      Problem: Discharge Planning:  Goal: Discharged to appropriate level of care  Description: Discharged to appropriate level of care  Outcome: Ongoing  Discharge planning in process. Problem: Anxiety:  Goal: Level of anxiety will decrease  Description: Level of anxiety will decrease  Outcome: Ongoing  Denies anxiety. Problem: Activity:  Goal: Sleeping patterns will improve  Description: Sleeping patterns will improve  Outcome: Ongoing  Patient slept 8.5 hours continuously last night. Problem: Coping:  Goal: Ability to identify problematic behaviors that deter socialization will improve  Description: Ability to identify problematic behaviors that deter socialization will improve  Outcome: Ongoing     Problem: Role Relationship:  Goal: Ability to verbalize awareness of feelings that lead to decreased social interaction will improve  Description: Ability to verbalize awareness of feelings that lead to decreased social interaction will improve  1/17/2021 1418 by Harvey Gaines RN  Outcome: Ongoing  1/17/2021 1306 by Ignacia Monzon  Outcome: Met This Shift  Note: Pt has attended 2/2 groups that have been offered so far on the unit this shift. Pt has been out on the unit and has been seen interacting with peers. Pt will be encouraged to continue group attendance and participation and to continue to interact with peers. Pt has met his socialization goal for this shift. Problem: Safety:  Goal: Risk of elopement will decrease  Description: Risk of elopement will decrease  Outcome: Ongoing  Denies any thoughts of elopement. Problem: Pain:  Goal: Pain level will decrease  Description: Pain level will decrease  Outcome: Ongoing  Denies any pain.    Goal: Control of acute pain  Description: Control of acute pain  Outcome: Ongoing  Goal: Control of chronic pain  Description: Control of chronic pain  Outcome: Ongoing

## 2021-01-17 NOTE — PLAN OF CARE
Problem: Anger Management/Homicidal Ideation:  Goal: Ability to verbalize frustrations and anger appropriately will improve  Description: Ability to verbalize frustrations and anger appropriately will improve  1/17/2021 0032 by Aman Rios RN  Outcome: Ongoing  Note: Patient verbalized frustrations appropriately to staff. 1/16/2021 1250 by Mira Kaplan RN  Outcome: Ongoing  Note: Patient was able to appropriately verbalize her frustrations today. Goal: Absence of angry outbursts  Description: Absence of angry outbursts  1/17/2021 0032 by Aman Rios RN  Outcome: Ongoing  Note: None noted so far this shift. 1/16/2021 1250 by Mira Kaplan RN  Outcome: Ongoing  Note: Free from any anger outburst so far this shift. Problem: Depressive Behavior With or Without Suicide Precautions:  Goal: Able to verbalize and/or display a decrease in depressive symptoms  Description: Able to verbalize and/or display a decrease in depressive symptoms  1/17/2021 0032 by Aman Rios RN  Outcome: Ongoing  Note: Patient denies any feelings of depression this shift. Patient noted with a blunt affect and brightens at times with interaction. 1/16/2021 1250 by Mira Kaplan RN  Outcome: Ongoing  Note: Rates her mood 8 out of 10 with 10 being the best mood. Denies anxiety and depression but clearly appears anxious and depressed. Goal: Ability to disclose and discuss suicidal ideas will improve  Description: Ability to disclose and discuss suicidal ideas will improve  1/17/2021 0032 by Aman Rios RN  Outcome: Ongoing  Note: Patient denies any suicidal thoughts this shift. 1/16/2021 1250 by Mira Kaplan RN  Outcome: Ongoing  Note: Denies sucidal thoughts. Goal: Absence of self-harm  Description: Absence of self-harm  1/17/2021 0032 by Aman Rios RN  Outcome: Ongoing  Note: No self harm noted so far this shift.   1/16/2021 1250 by Mira Kaplan RN Outcome: Ongoing  Note: Free from any self harming behaviors so far this shift. Goal: Patient specific goal  Description: Patient specific goal  1/17/2021 0032 by Varun Little RN  Outcome: Ongoing  Note: Patient stated her goal was to be discharged. 1/16/2021 1250 by Marvin Mazariegos RN  Outcome: Ongoing  Note: Refused to set goal.   Goal: Participates in care planning  Description: Participates in care planning  1/17/2021 0032 by Varun Little RN  Outcome: Ongoing  Note: Care plan reviewed with patient and fair understanding verbalized. 1/16/2021 1250 by Marvin Mazariegos RN  Outcome: Ongoing  Note: Care plan reviewed with patient. Patient does verbalize understanding of the plan of care and does contribute to goal setting      Problem: Discharge Planning:  Goal: Discharged to appropriate level of care  Description: Discharged to appropriate level of care  1/17/2021 0032 by Varun Little RN  Outcome: Ongoing  Note: Patient states she plans to return home with her mother at discharge and follow up with her therapist.   1/16/2021 1250 by Marvin Mazariegos RN  Outcome: Ongoing  Note: Discharge planning in process. Problem: Anxiety:  Goal: Level of anxiety will decrease  Description: Level of anxiety will decrease  1/17/2021 0032 by Varun Little RN  Outcome: Ongoing  Note: Patient states she feels slightly anxious at intervals. 1/16/2021 1250 by Marvin Mazariegos RN  Outcome: Ongoing  Note: Patient appears anxious when she was told that she couldn't leave til Monday. Problem: Activity:  Goal: Sleeping patterns will improve  Description: Sleeping patterns will improve  1/17/2021 0032 by Varun Little RN  Outcome: Ongoing  Note: Patient states she feels she is sleeping well. 1/16/2021 1250 by Marvin Mazariegos RN  Outcome: Ongoing  Note: Patient slept 8.5 hours continuously last night.       Problem: Coping: Goal: Ability to identify problematic behaviors that deter socialization will improve  Description: Ability to identify problematic behaviors that deter socialization will improve  1/17/2021 0032 by Jorge Mathias RN  Outcome: Ongoing  Note: Ongoing. 1/16/2021 1250 by Nathaly Ramirez RN  Outcome: Ongoing     Problem: Role Relationship:  Goal: Ability to verbalize awareness of feelings that lead to decreased social interaction will improve  Description: Ability to verbalize awareness of feelings that lead to decreased social interaction will improve  1/17/2021 0032 by Jorge Mathias RN  Outcome: Ongoing  Note: Ongoing. 1/16/2021 1250 by Nathaly Ramirez RN  Outcome: Ongoing     Problem: Safety:  Goal: Risk of elopement will decrease  Description: Risk of elopement will decrease  1/17/2021 0032 by Jorge Mathias RN  Outcome: Ongoing  Note: No elopement attempts noted this shift. 1/16/2021 1250 by Nathaly Ramirez RN  Outcome: Ongoing  Note: Patient has not verbalized any thoughts of elopement. Problem: Pain:  Goal: Pain level will decrease  Description: Pain level will decrease  Outcome: Ongoing  Note: Patient states a current pain level of a 3. Goal: Control of acute pain  Description: Control of acute pain  Outcome: Ongoing  Note: Patient stated right elbow aching at intervals. Goal: Control of chronic pain  Description: Control of chronic pain  Outcome: Ongoing  Note: None verbalized this shift. Care plan reviewed with patient.   Patient does verbalize understanding of the plan of care and does contribute to goal setting

## 2021-01-17 NOTE — GROUP NOTE
Group Therapy Note    Date: 1/17/2021    Group Start Time: 1100  Group End Time: 7420  Group Topic: Recreational    [facility]    CHARANJIT Walker    Group Therapy Note    Attendees: 6         Patient's Goal:  Pt will improve socialization and knowledge of coping skills    Notes:  Pt displayed active participation in group. Pt discussed where they would like to be in the future, the advice they would give themselves, steps they would take to get there, and overall why it is important for them to reach those goals. Pt was interactive with others, contributed to group appropriately, and identified positive attributes about self. Pt also discussed the example she would like to set for her son, and what she hopes for his life. Status After Intervention:  Improved    Participation Level:  Active Listener and Interactive    Participation Quality: Appropriate, Attentive and Sharing      Speech:  normal      Thought Process/Content: Linear      Affective Functioning: Congruent      Mood: euthymic      Level of consciousness:  Alert, Oriented x4 and Attentive      Response to Learning: Able to verbalize current knowledge/experience, Able to verbalize/acknowledge new learning, Able to retain information, Capable of insight, Able to change behavior and Progressing to goal      Endings: None Reported    Modes of Intervention: Education, Support, Socialization, Exploration, Clarifying, Activity, Confrontation and Reality-testing      Discipline Responsible: Psychoeducational Specialist      Signature:  CHARANJIT Wilson

## 2021-01-17 NOTE — BH NOTE
Group Therapy Note    Date: 1/16/2021  Start Time: 2000  End Time:  2020  Number of Participants: 1    Type of Group: Wrap-Up/Relaxation    Wellness Binder Information  Module Name:  None  Session Number:  None    Patient's Goal:  To be discharged    Notes:  Ongoing    Status After Intervention:  Improved    Participation Level: Interactive    Participation Quality: Appropriate      Speech:  normal      Thought Process/Content: Logical      Affective Functioning: Congruent      Mood: anxious      Level of consciousness:  Oriented x4      Response to Learning: Capable of insight      Endings: None Reported    Modes of Intervention: Education      Discipline Responsible: Registered Nurse      Signature:   Bautista Quevedo RN

## 2021-01-17 NOTE — PLAN OF CARE
Problem: Role Relationship:  Goal: Ability to verbalize awareness of feelings that lead to decreased social interaction will improve  Description: Ability to verbalize awareness of feelings that lead to decreased social interaction will improve  Outcome: Met This Shift  Note: Pt has attended 2/2 groups that have been offered so far on the unit this shift. Pt has been out on the unit and has been seen interacting with peers. Pt will be encouraged to continue group attendance and participation and to continue to interact with peers. Pt has met his socialization goal for this shift.

## 2021-01-18 VITALS
WEIGHT: 164.25 LBS | RESPIRATION RATE: 16 BRPM | HEART RATE: 88 BPM | BODY MASS INDEX: 26.4 KG/M2 | SYSTOLIC BLOOD PRESSURE: 120 MMHG | DIASTOLIC BLOOD PRESSURE: 79 MMHG | HEIGHT: 66 IN | OXYGEN SATURATION: 99 % | TEMPERATURE: 97.4 F

## 2021-01-18 PROCEDURE — 99239 HOSP IP/OBS DSCHRG MGMT >30: CPT | Performed by: PSYCHIATRY & NEUROLOGY

## 2021-01-18 PROCEDURE — 6370000000 HC RX 637 (ALT 250 FOR IP): Performed by: PSYCHIATRY & NEUROLOGY

## 2021-01-18 PROCEDURE — 5130000000 HC BRIDGE APPOINTMENT

## 2021-01-18 RX ORDER — OLANZAPINE 7.5 MG/1
7.5 TABLET ORAL 2 TIMES DAILY
Qty: 60 TABLET | Refills: 0 | Status: SHIPPED | OUTPATIENT
Start: 2021-01-18 | End: 2022-03-30

## 2021-01-18 RX ADMIN — NORGESTIMATE AND ETHINYL ESTRADIOL 1 TABLET: KIT ORAL at 09:00

## 2021-01-18 RX ADMIN — OLANZAPINE 7.5 MG: 2.5 TABLET, FILM COATED ORAL at 08:59

## 2021-01-18 ASSESSMENT — PAIN SCALES - GENERAL: PAINLEVEL_OUTOF10: 0
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
## 2021-01-18 NOTE — PLAN OF CARE
1/17/2021 1418 by Pura Kaplan RN  Outcome: Ongoing  Goal: Participates in care planning  Description: Participates in care planning  1/17/2021 2301 by Rossi James RN  Outcome: Ongoing  Note: Care plan reviewed with patient and good understanding verbalized. 1/17/2021 1418 by Pura Kaplan RN  Outcome: Ongoing     Problem: Discharge Planning:  Goal: Discharged to appropriate level of care  Description: Discharged to appropriate level of care  1/17/2021 2301 by Rossi James RN  Outcome: Ongoing  Note: Patient states she plans to return home with her mother and baby at discharge and continues to follow up with her counselor. 1/17/2021 1418 by Pura Kaplan RN  Outcome: Ongoing     Problem: Anxiety:  Goal: Level of anxiety will decrease  Description: Level of anxiety will decrease  1/17/2021 2301 by Rossi James RN  Outcome: Ongoing  Note: Patient denies any anxiety so far this shift. 1/17/2021 1418 by Pura Kaplan RN  Outcome: Ongoing     Problem: Activity:  Goal: Sleeping patterns will improve  Description: Sleeping patterns will improve  1/17/2021 2301 by Rossi James RN  Outcome: Ongoing  Note: Patient states she feels she is resting well. 1/17/2021 1418 by Pura Kaplan RN  Outcome: Ongoing     Problem: Coping:  Goal: Ability to identify problematic behaviors that deter socialization will improve  Description: Ability to identify problematic behaviors that deter socialization will improve  1/17/2021 2301 by Rossi James RN  Outcome: Ongoing  Note: Ongoing. 1/17/2021 1418 by Pura Kaplan RN  Outcome: Ongoing     Problem: Role Relationship:  Goal: Ability to verbalize awareness of feelings that lead to decreased social interaction will improve  Description: Ability to verbalize awareness of feelings that lead to decreased social interaction will improve  1/17/2021 2301 by Rossi James RN  Outcome: Ongoing  Note: Ongoing. 1/17/2021 1418 by Ofelia Mathews RN  Outcome: Ongoing  1/17/2021 1306 by Willian Vazquez  Outcome: Met This Shift  Note: Pt has attended 2/2 groups that have been offered so far on the unit this shift. Pt has been out on the unit and has been seen interacting with peers. Pt will be encouraged to continue group attendance and participation and to continue to interact with peers. Pt has met his socialization goal for this shift. Problem: Safety:  Goal: Risk of elopement will decrease  Description: Risk of elopement will decrease  1/17/2021 2301 by Colton Juarez RN  Outcome: Ongoing  Note: No elopement attempts noted this shift. 1/17/2021 1418 by Ofelia Mathews RN  Outcome: Ongoing     Problem: Pain:  Goal: Pain level will decrease  Description: Pain level will decrease  1/17/2021 2301 by Colton Juarez RN  Outcome: Ongoing  Note: No pain stated this shift. 1/17/2021 1418 by Ofelia Mathews RN  Outcome: Ongoing  Goal: Control of acute pain  Description: Control of acute pain  1/17/2021 2301 by Colton Juarez RN  Outcome: Ongoing  Note: None verbalized. 1/17/2021 1418 by Ofelia Mathews RN  Outcome: Ongoing  Goal: Control of chronic pain  Description: Control of chronic pain  1/17/2021 2301 by Colton Juarez RN  Outcome: Ongoing  Note: None verbalized. 1/17/2021 1418 by Ofelia Mathews RN  Outcome: Ongoing   Care plan reviewed with patient.   Patient does verbalize understanding of the plan of care and does contribute to goal setting

## 2021-01-18 NOTE — DISCHARGE INSTR - COC
Continuity of Care Form    Patient Name: Marifer Smith   :  2000  MRN:  [id number]    Admit date:  2021  Discharge date:  ***    Code Status Order: Full Code   Advance Directives:   Advance Care Flowsheet Documentation     Date/Time Healthcare Directive Type of Healthcare Directive Copy in [address] Agent's Name Healthcare Agent's Phone Number    [phone]  No, patient does not have an advance directive for healthcare treatment -- -- -- -- --          Admitting Physician:  Coretta Alatorre MD  PCP: No primary care provider on file. Discharging Nurse: [facility]/Room#: 4E-68/068-A  Discharging Unit Phone Number: ***    Emergency Contact:   Extended Emergency Contact Information  Primary Emergency Contact: Jese Ortiz  Address: [address] [place] [address], [address] [place] [address] Phone: [phone]  Relation: Parent  Secondary Emergency Contact: Tammy Kiser  Mobile Phone: [phone]  Relation: Brother/Sister   needed? No    Past Surgical History:  History reviewed. No pertinent surgical history. Immunization History: There is no immunization history for the selected administration types on file for this patient.     Active Problems:  Patient Active Problem List   Diagnosis Code    False labor O47.9    Normal  O80    Major depressive disorder, single episode F32.9    Severe episode of recurrent major depressive disorder, without psychotic features ([facility] [address] F33.2       Isolation/Infection:   Isolation          No Isolation        Patient Infection Status     Infection Onset Added Last Indicated Last Indicated By Review Planned Expiration Resolved Resolved By    None active    Resolved    COVID-19 Rule Out 21 COVID-19 (Ordered)   21 Rule-Out Test Resulted          Nurse Assessment: Last Vital Signs: /79   Pulse 88   Temp 97.4 °F (36.3 °C) (Tympanic)   Resp 16   Ht 5' 6\" (1.676 m)   Wt 164 lb 4 oz (74.5 kg)   SpO2 99%   BMI 26.51 kg/m²     Last documented pain score (0-10 scale): Pain Level: 0  Last Weight:   Wt Readings from Last 1 Encounters:   01/13/21 164 lb 4 oz (74.5 kg)     Mental Status:  {IP PT MENTAL STATUS:20030}    IV Access:  { MAYO IV ACCESS:280666606}    Nursing Mobility/ADLs:  Walking   {P DME JQWP:724921496}  Transfer  {[facility] DME VXOP:771131429}  Bathing  {[facility] DME BKKI:136519847}  Dressing  {P DME AZFC:592659136}  Toileting  {P DME CHUU:008541659}  Feeding  {[facility] DME RYAH:181767091}  Med Admin  {[facility] DME JUOX:667303945}  Med Delivery   { MAYO MED Delivery:215008958}    Wound Care Documentation and Therapy:        Elimination:  Continence:   · Bowel: {YES / LK:80445}  · Bladder: {YES / ET:87912}  Urinary Catheter: {Urinary Catheter:082053120}   Colostomy/Ileostomy/Ileal Conduit: {YES / GT:24658}       Date of Last BM: ***    Intake/Output Summary (Last 24 hours) at 1/18/2021 1029  Last data filed at 1/17/2021 2000  Gross per 24 hour   Intake 980 ml   Output    Net 980 ml     I/O last 3 completed shifts:   In: 26 [P.O.:1460]  Out: -     Safety Concerns:     [address] [employer] Safety Concerns:511569178}    Impairments/Disabilities:      [employer] Impairments/Disabilities:217007739}    Nutrition Therapy:  Current Nutrition Therapy:   [employer] Diet List:635448339}    Routes of Feeding: {P DME Other Feedings:870853193}  Liquids: {Slp liquid thickness:16225}  Daily Fluid Restriction: {CHP DME Yes amt example:721961488}  Last Modified Barium Swallow with Video (Video Swallowing Test): {Done Not Done BMVC:826493007}    Treatments at the Time of Hospital Discharge:   Respiratory Treatments: ***  Oxygen Therapy:  {Therapy; copd oxygen:06755}  Ventilator:    {MH CC Vent PSIY:995322019}    Rehab Therapies: {THERAPEUTIC INTERVENTION:0771116647} Weight Bearing Status/Restrictions: [address] CC Weight Bearin}  Other Medical Equipment (for information only, NOT a DME order):  {EQUIPMENT:612350550}  Other Treatments: ***    Patient's personal belongings (please select all that are sent with patient):  {CHP DME Belongings:489554624}    RN SIGNATURE:  {Esignature:563308972}    CASE MANAGEMENT/SOCIAL WORK SECTION    Inpatient Status Date: ***    Readmission Risk Assessment Score:  Readmission Risk              Risk of Unplanned Readmission:        6           Discharging to Facility/ Agency   · Name:   · Address:  · Phone:  · Fax:    Dialysis Facility (if applicable)   · Name:  · Address:  · Dialysis Schedule:  · Phone:  · Fax:    / signature: {Esignature:565547327}    PHYSICIAN SECTION    Prognosis: {Prognosis:1770120433}    Condition at Discharge: [address] Patient Condition:443339132}    Rehab Potential (if transferring to Rehab): {Prognosis:9185448253}    Recommended Labs or Other Treatments After Discharge: ***    Physician Certification: I certify the above information and transfer of Dloores Schafer  is necessary for the continuing treatment of the diagnosis listed and that she requires {Admit to Appropriate Level of Care:74099} for {GREATER/LESS:663743359} 30 days.      Update Admission H&P: {CHP DME Changes in DOLCW:336770534}    PHYSICIAN SIGNATURE:  {Esignature:032902595}

## 2021-01-18 NOTE — DISCHARGE INSTR - DIET
? Good nutrition is important when healing from an illness, injury, or surgery. Follow any nutrition recommendations given to you during your hospital stay. ? If you were given an oral nutrition supplement while in the hospital, continue to take this supplement at home. You can take it with meals, in-between meals, and/or before bedtime. These supplements can be purchased at most local grocery stores, pharmacies, and chain [employer]-stores. ? If you have any questions about your diet or nutrition, call the hospital and ask for the dietitian.   Resume Diet As Tolerated

## 2021-01-18 NOTE — SUICIDE SAFETY PLAN
Behavioral Health   Discharge Note    Pt discharged with followings belongings:   Dentures: None  Vision - Corrective Lenses: None  Hearing Aid: None  Jewelry: Other (Comment)  Body Piercings Removed: No  Clothing: Footwear, Shirt, Undergarments (Comment), Socks  Were All Patient Medications Collected?: Yes  Other Valuables: Cell phone   Valuables sent home with N/A. Valuables retrieved from safe, Security envelope number:  Medications [id number] and returned to patient. Patient left department with transport via Ambulation. Discharged to private residence. \"An Important Message from [employer] About Your Rights\" form photocopy original from admission and provided to pt at discharge N. Patient education on aftercare instructions: YES  Bridge Appointment completed: Reviewed Discharge Instructions with patient. Patient verbalizes understanding and agreement with the discharge plan using the teachback method. Information faxed to N/A by N/A Patient verbalize understanding of AVS:  YES.     Status EXAM upon discharge:  Status and Exam  Normal: Yes  Facial Expression: Brightened  Affect: Appropriate  Level of Consciousness: Alert  Mood:Normal: Yes  Mood: Anxious  Motor Activity:Normal: Yes  Motor Activity: Decreased  Interview Behavior: Cooperative  Preception: [place] to Person, Arnel Lust to Time, [place] to Place, [place] to Situation  Attention:Normal: Yes  Attention: Unable to Concentrate  Thought Processes: Circumstantial  Thought Content:Normal: Yes  Thought Content: Preoccupations  Hallucinations: None  Delusions: No  Memory:Normal: Yes  Insight and Judgment: Yes  Insight and Judgment: Poor Judgment  Present Suicidal Ideation: No  Present Homicidal Ideation: No    Mary Park LPN

## 2021-01-18 NOTE — BH NOTE
Group Therapy Note    Date: 1/17/2021  Start Time: 2000  End Time:  2020  Number of Participants: 1    Type of Group: Wrap-Up/Relaxation    Wellness Binder Information  Module Name:  None  Session Number:  None    Patient's Goal:  To work on how I react to situations    Notes:  Ongoing    Status After Intervention:  Improved    Participation Level: Interactive    Participation Quality: Attentive      Speech:  normal      Thought Process/Content: Logical      Affective Functioning: Congruent      Mood: anxious      Level of consciousness:  Oriented x4      Response to Learning: Capable of insight      Endings: None Reported    Modes of Intervention: Education      Discipline Responsible: Registered Nurse      Signature:   Tia Parker RN

## 2021-01-18 NOTE — PROGRESS NOTES
This RN has reviewed and agrees with GALYA Hill LPN's data collection and has collaborated with this LPN regarding the patient's care plan.

## 2021-01-19 ENCOUNTER — TELEPHONE (OUTPATIENT)
Dept: PSYCHIATRY | Age: 21
End: 2021-01-19

## 2021-03-30 ENCOUNTER — HOSPITAL ENCOUNTER (OUTPATIENT)
Age: 21
Setting detail: SPECIMEN
Discharge: HOME | End: 2021-03-30
Payer: MEDICARE

## 2021-03-31 LAB
C. TRACHOMATIS DNA ,URINE: NEGATIVE
DIRECT EXAM: NORMAL
Lab: NORMAL
N. GONORRHOEAE DNA, URINE: NEGATIVE
SOURCE: NORMAL
SPECIMEN DESCRIPTION: NORMAL
SPECIMEN DESCRIPTION: NORMAL
TRICHOMONAS VAGINALI, MOLECULAR: NEGATIVE

## 2021-08-27 ENCOUNTER — HOSPITAL ENCOUNTER (EMERGENCY)
Age: 21
Discharge: LEFT BEFORE DISCHARGE | End: 2021-08-27
Payer: MEDICARE

## 2021-08-27 VITALS
DIASTOLIC BLOOD PRESSURE: 88 MMHG | TEMPERATURE: 98.7 F | OXYGEN SATURATION: 99 % | HEART RATE: 93 BPM | RESPIRATION RATE: 18 BRPM | SYSTOLIC BLOOD PRESSURE: 125 MMHG

## 2021-08-27 ASSESSMENT — PAIN SCALES - GENERAL: PAINLEVEL_OUTOF10: 4

## 2021-08-27 ASSESSMENT — PAIN DESCRIPTION - LOCATION: LOCATION: KNEE

## 2021-08-27 ASSESSMENT — PAIN DESCRIPTION - ORIENTATION: ORIENTATION: LEFT

## 2021-08-27 ASSESSMENT — PAIN DESCRIPTION - PAIN TYPE: TYPE: ACUTE PAIN
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
## 2022-03-22 ENCOUNTER — TELEPHONE (OUTPATIENT)
Dept: FAMILY MEDICINE CLINIC | Age: 22
End: 2022-03-22

## 2022-03-22 NOTE — TELEPHONE ENCOUNTER
----- Message from Lauren Ramirez sent at 3/22/2022  1:40 PM EDT -----  Subject: Appointment Request    Reason for Call: New Patient Request Appointment    QUESTIONS  Type of Appointment? New Patient/New to Provider  Reason for appointment request? No appointments available during search  Additional Information for Provider? New PT, est PCP with [facility]. PT   sister, Brown Stewart, referred. Please call PT to schedule new PT appt.  ---------------------------------------------------------------------------  --------------  CALL BACK INFO  What is the best way for the office to contact you? OK to leave message on   voicemail  Preferred Call Back Phone Number? [phone]  ---------------------------------------------------------------------------  --------------  SCRIPT ANSWERS  Relationship to Patient? Self  Specialty Confirmation? Primary Care  Is this the first appointment to establish care for a ? No  Have you been diagnosed with, awaiting test results for, or told that you   are suspected of having COVID-19 (Coronavirus)? (If patient has tested   negative or was tested as a requirement for work, school, or travel and   not based on symptoms, answer no)? No  Within the past 10 days have you developed any of the following symptoms   (answer no if symptoms have been present longer than 10 days or began   more than 10 days ago)? Fever or Chills, Cough, Shortness of breath or   difficulty breathing, Loss of taste or smell, Sore throat, Nasal   congestion, Sneezing or runny nose, Fatigue or generalized body aches   (answer no if pain is specific to a body part e.g. back pain), Diarrhea,   Headache?  Yes

## 2022-03-30 ENCOUNTER — OFFICE VISIT (OUTPATIENT)
Dept: FAMILY MEDICINE CLINIC | Age: 22
End: 2022-03-30
Payer: MEDICARE

## 2022-03-30 VITALS
WEIGHT: 177 LBS | HEART RATE: 108 BPM | BODY MASS INDEX: 28.45 KG/M2 | HEIGHT: 66 IN | DIASTOLIC BLOOD PRESSURE: 66 MMHG | SYSTOLIC BLOOD PRESSURE: 118 MMHG | RESPIRATION RATE: 16 BRPM

## 2022-03-30 DIAGNOSIS — Z13.220 SCREENING FOR HYPERLIPIDEMIA: ICD-10-CM

## 2022-03-30 DIAGNOSIS — F31.60 BIPOLAR 1 DISORDER, MIXED (HCC): Primary | ICD-10-CM

## 2022-03-30 DIAGNOSIS — Z13.31 POSITIVE DEPRESSION SCREENING: ICD-10-CM

## 2022-03-30 DIAGNOSIS — Z13.1 SCREENING FOR DIABETES MELLITUS: ICD-10-CM

## 2022-03-30 PROBLEM — F43.22 ADJUSTMENT DISORDER WITH ANXIOUS MOOD: Status: ACTIVE | Noted: 2021-03-30

## 2022-03-30 PROBLEM — E66.9 SIMPLE OBESITY: Status: ACTIVE | Noted: 2020-03-23

## 2022-03-30 PROBLEM — F32.9 MAJOR DEPRESSIVE DISORDER, SINGLE EPISODE: Status: RESOLVED | Noted: 2021-01-13 | Resolved: 2022-03-30

## 2022-03-30 PROBLEM — O47.9 FALSE LABOR: Status: RESOLVED | Noted: 2020-10-19 | Resolved: 2022-03-30

## 2022-03-30 PROBLEM — E66.9 SIMPLE OBESITY: Status: RESOLVED | Noted: 2020-03-23 | Resolved: 2022-03-30

## 2022-03-30 PROBLEM — F17.200 TOBACCO DEPENDENCE SYNDROME: Status: ACTIVE | Noted: 2021-08-19

## 2022-03-30 PROBLEM — F41.9 ANXIETY: Status: RESOLVED | Noted: 2017-09-26 | Resolved: 2022-03-30

## 2022-03-30 PROBLEM — E66.3 OVERWEIGHT: Status: ACTIVE | Noted: 2021-03-30

## 2022-03-30 PROBLEM — R51.9 HEADACHE: Status: ACTIVE | Noted: 2021-08-19

## 2022-03-30 PROBLEM — E66.3 OVERWEIGHT: Status: RESOLVED | Noted: 2021-03-30 | Resolved: 2022-03-30

## 2022-03-30 PROBLEM — F32.A DEPRESSIVE DISORDER: Status: ACTIVE | Noted: 2017-09-26

## 2022-03-30 PROBLEM — F32.A DEPRESSIVE DISORDER: Status: RESOLVED | Noted: 2017-09-26 | Resolved: 2022-03-30

## 2022-03-30 PROBLEM — F41.9 ANXIETY: Status: ACTIVE | Noted: 2017-09-26

## 2022-03-30 PROBLEM — R51.9 HEADACHE: Status: RESOLVED | Noted: 2021-08-19 | Resolved: 2022-03-30

## 2022-03-30 PROCEDURE — G8484 FLU IMMUNIZE NO ADMIN: HCPCS | Performed by: NURSE PRACTITIONER

## 2022-03-30 PROCEDURE — 4004F PT TOBACCO SCREEN RCVD TLK: CPT | Performed by: NURSE PRACTITIONER

## 2022-03-30 PROCEDURE — 99203 OFFICE O/P NEW LOW 30 MIN: CPT | Performed by: NURSE PRACTITIONER

## 2022-03-30 PROCEDURE — G8427 DOCREV CUR MEDS BY ELIG CLIN: HCPCS | Performed by: NURSE PRACTITIONER

## 2022-03-30 PROCEDURE — G8419 CALC BMI OUT NRM PARAM NOF/U: HCPCS | Performed by: NURSE PRACTITIONER

## 2022-03-30 RX ORDER — ARIPIPRAZOLE 5 MG/1
5 TABLET ORAL DAILY
Qty: 30 TABLET | Refills: 3 | Status: SHIPPED | OUTPATIENT
Start: 2022-03-30 | End: 2022-05-26

## 2022-03-30 SDOH — ECONOMIC STABILITY: FOOD INSECURITY: WITHIN THE PAST 12 MONTHS, YOU WORRIED THAT YOUR FOOD WOULD RUN OUT BEFORE YOU GOT MONEY TO BUY MORE.: NEVER TRUE

## 2022-03-30 SDOH — ECONOMIC STABILITY: FOOD INSECURITY: WITHIN THE PAST 12 MONTHS, THE FOOD YOU BOUGHT JUST DIDN'T LAST AND YOU DIDN'T HAVE MONEY TO GET MORE.: NEVER TRUE

## 2022-03-30 ASSESSMENT — PATIENT HEALTH QUESTIONNAIRE - PHQ9
SUM OF ALL RESPONSES TO PHQ9 QUESTIONS 1 & 2: 6
SUM OF ALL RESPONSES TO PHQ QUESTIONS 1-9: 13
10. IF YOU CHECKED OFF ANY PROBLEMS, HOW DIFFICULT HAVE THESE PROBLEMS MADE IT FOR YOU TO DO YOUR WORK, TAKE CARE OF THINGS AT HOME, OR GET ALONG WITH OTHER PEOPLE: 3
9. THOUGHTS THAT YOU WOULD BE BETTER OFF DEAD, OR OF HURTING YOURSELF: 0
8. MOVING OR SPEAKING SO SLOWLY THAT OTHER PEOPLE COULD HAVE NOTICED. OR THE OPPOSITE, BEING SO FIGETY OR RESTLESS THAT YOU HAVE BEEN MOVING AROUND A LOT MORE THAN USUAL: 0
5. POOR APPETITE OR OVEREATING: 1
2. FEELING DOWN, DEPRESSED OR HOPELESS: 3
SUM OF ALL RESPONSES TO PHQ QUESTIONS 1-9: 13
SUM OF ALL RESPONSES TO PHQ QUESTIONS 1-9: 13
1. LITTLE INTEREST OR PLEASURE IN DOING THINGS: 3
3. TROUBLE FALLING OR STAYING ASLEEP: 0
6. FEELING BAD ABOUT YOURSELF - OR THAT YOU ARE A FAILURE OR HAVE LET YOURSELF OR YOUR FAMILY DOWN: 3
SUM OF ALL RESPONSES TO PHQ QUESTIONS 1-9: 13
4. FEELING TIRED OR HAVING LITTLE ENERGY: 3
7. TROUBLE CONCENTRATING ON THINGS, SUCH AS READING THE NEWSPAPER OR WATCHING TELEVISION: 0

## 2022-03-30 ASSESSMENT — SOCIAL DETERMINANTS OF HEALTH (SDOH): HOW HARD IS IT FOR YOU TO PAY FOR THE VERY BASICS LIKE FOOD, HOUSING, MEDICAL CARE, AND HEATING?: NOT HARD AT ALL

## 2022-03-30 NOTE — PROGRESS NOTES
Subjective:      Patient ID: Maldonado Fiscal 2000 is a 24 y.o. female here for evaluation. NP to establish care. Former PCP was Dr. Michael Dasilva    No past medical history on file. No past surgical history on file. Family History   Problem Relation Age of Onset    Colon Cancer Neg Hx     Colon Polyps Neg Hx     Esophageal Cancer Neg Hx        Current Outpatient Medications   Medication Sig Dispense Refill    ARIPiprazole (ABILIFY) 5 MG tablet Take 1 tablet by mouth daily 30 tablet 3     No current facility-administered medications for this visit. Smoke: 1/2 ppd. Chief Complaint   Patient presents with    New Patient       Patient Active Problem List   Diagnosis    Severe episode of recurrent major depressive disorder, without psychotic features ([facility] [address]    Adjustment disorder with anxious mood    Tobacco dependence syndrome        Long standing mental health. Depression and precious. Was admitted for postpartum depression 1 year ago. Has 17 month old son at home. Will have her highs and lows. Has been on medication in past that caused SE. Last  medication was on Zyprexa but had tolerance issues. Denies CP, SOB or chest tightness    Vitals:    03/30/22 1521   BP: 118/66   Pulse: 108   Resp: 16      Screening labs due.      No results found for: LABA1C  No results found for: EAG    No components found for: CHLPL  No results found for: TRIG  No results found for: HDL  No results found for: LDLCALC  No results found for: LABVLDL      Chemistry        Component Value Date/Time     (L) 01/13/2021 1829    K 3.8 01/13/2021 1829    CL 99 01/13/2021 1829    CO2 24 01/13/2021 1829    BUN 9 01/13/2021 1829    CREATININE 1.0 01/13/2021 1829        Component Value Date/Time    CALCIUM 9.5 01/13/2021 1829    ALKPHOS 66 01/13/2021 1829    AST 14 01/13/2021 1829    ALT 11 01/13/2021 1829    BILITOT 0.4 01/13/2021 1829            Lab Results   Component Value Date    TSH 2.500 01/13/2021       Lab Results   Component Value Date    WBC 10.5 01/13/2021    HGB 13.8 01/13/2021    HCT 43.3 01/13/2021    MCV 88.9 01/13/2021     01/13/2021         Health Maintenance   Topic Date Due    Hepatitis C screen  Never done    Varicella vaccine (1 of 2 - 2-dose childhood series) Never done    Depression Monitoring  Never done    HIV screen  Never done    Flu vaccine (1) Never done    Pap smear  Never done    Chlamydia screen  03/30/2022    COVID-19 Vaccine (3 - Booster for Pfizer series) 04/22/2022    DTaP/Tdap/Td vaccine (6 - Td or Tdap) 08/02/2022    Hepatitis B vaccine  Completed    Hib vaccine  Completed    HPV vaccine  Completed    Meningococcal (ACWY) vaccine  Completed    Hepatitis A vaccine  Aged Out    Pneumococcal 0-64 years Vaccine  Aged [employer] History   Administered Date(s) Administered    COVID-19, Pfizer Purple top, DILUTE for use, 12+ yrs, 30mcg/0.3mL dose 10/13/2021, 11/22/2021    DTaP vaccine 2000, 04/06/2001, 04/19/2002, 01/13/2003    HPV Quadrivalent (Gardasil) 08/02/2012, 10/04/2012, 02/08/2013    Hepatitis B Ped/Adol (Engerix-B, Recombivax HB) 2000, 2000, 04/06/2001    Hib vaccine 2000, 04/06/2001, 04/19/2002    MMR 04/19/2002, 09/26/2014    Meningococcal MCV4O (Menveo) 02/01/2018    Meningococcal MCV4P (Menactra) 08/02/2012    Pneumococcal Conjugate 7-valent (Prevnar7) 04/06/2001    Polio IPV (IPOL) 2000, 04/06/2001, 04/19/2002    Tdap (Boostrix, Adacel) 08/02/2012       Review of Systems    Objective:   Physical Exam     Assessment:       Diagnosis Orders   1. Bipolar 1 disorder, mixed (HCC)  Vitamin D 25 Hydroxy    ARIPiprazole (ABILIFY) 5 MG tablet   2. Positive depression screening     3. Screening for hyperlipidemia  Lipid Panel    TSH with Reflex   4.  Screening for diabetes mellitus  CBC    Comprehensive Metabolic Panel    Hemoglobin A1C           Plan:      Pmhx reviewed  Abilify 5 mg  Non-pharm tx discussed  Screening Labs  RTO in 1 month    Current Outpatient Medications   Medication Sig Dispense Refill    ARIPiprazole (ABILIFY) 5 MG tablet Take 1 tablet by mouth daily 30 tablet 3     No current facility-administered medications for this visit. PHQ-9 score today: (PHQ-9 Total Score: 13), additional evaluation and assessment performed, follow-up plan includes but not limited to: Medication management and Referral to /Specialist  for evaluation and management.

## 2022-04-27 ENCOUNTER — HOSPITAL ENCOUNTER (OUTPATIENT)
Age: 22
Discharge: HOME | End: 2022-04-27
Payer: MEDICARE

## 2022-04-27 DIAGNOSIS — Z13.220 SCREENING FOR HYPERLIPIDEMIA: ICD-10-CM

## 2022-04-27 DIAGNOSIS — F31.60 BIPOLAR 1 DISORDER, MIXED (HCC): ICD-10-CM

## 2022-04-27 DIAGNOSIS — Z13.1 SCREENING FOR DIABETES MELLITUS: ICD-10-CM

## 2022-04-27 LAB
ALBUMIN SERPL-MCNC: 4.5 G/DL (ref 3.5–5.1)
ALP BLD-CCNC: 63 U/L (ref 38–126)
ALT SERPL-CCNC: 14 U/L (ref 11–66)
ANION GAP SERPL CALCULATED.3IONS-SCNC: 9 MEQ/L (ref 8–16)
AST SERPL-CCNC: 15 U/L (ref 5–40)
AVERAGE GLUCOSE: 84 MG/DL (ref 70–126)
BILIRUB SERPL-MCNC: 0.2 MG/DL (ref 0.3–1.2)
BUN BLDV-MCNC: 11 MG/DL (ref 7–22)
CALCIUM SERPL-MCNC: 9 MG/DL (ref 8.5–10.5)
CHLORIDE BLD-SCNC: 103 MEQ/L (ref 98–111)
CHOLESTEROL, TOTAL: 143 MG/DL (ref 100–199)
CO2: 25 MEQ/L (ref 23–33)
CREAT SERPL-MCNC: 0.7 MG/DL (ref 0.4–1.2)
ERYTHROCYTE [DISTWIDTH] IN BLOOD BY AUTOMATED COUNT: 13.4 % (ref 11.5–14.5)
ERYTHROCYTE [DISTWIDTH] IN BLOOD BY AUTOMATED COUNT: 45.3 FL (ref 35–45)
GFR SERPL CREATININE-BSD FRML MDRD: > 90 ML/MIN/1.73M2
GLUCOSE BLD-MCNC: 93 MG/DL (ref 70–108)
HBA1C MFR BLD: 4.8 % (ref 4.4–6.4)
HCT VFR BLD CALC: 43.7 % (ref 37–47)
HDLC SERPL-MCNC: 55 MG/DL
HEMOGLOBIN: 14 GM/DL (ref 12–16)
LDL CHOLESTEROL CALCULATED: 79 MG/DL
MCH RBC QN AUTO: 29.3 PG (ref 26–33)
MCHC RBC AUTO-ENTMCNC: 32 GM/DL (ref 32.2–35.5)
MCV RBC AUTO: 91.4 FL (ref 81–99)
PLATELET # BLD: 339 THOU/MM3 (ref 130–400)
PMV BLD AUTO: 9.1 FL (ref 9.4–12.4)
POTASSIUM SERPL-SCNC: 4.2 MEQ/L (ref 3.5–5.2)
RBC # BLD: 4.78 MILL/MM3 (ref 4.2–5.4)
SODIUM BLD-SCNC: 137 MEQ/L (ref 135–145)
TOTAL PROTEIN: 6.9 G/DL (ref 6.1–8)
TRIGL SERPL-MCNC: 44 MG/DL (ref 0–199)
TSH SERPL DL<=0.05 MIU/L-ACNC: 1.41 UIU/ML (ref 0.4–4.2)
VITAMIN D 25-HYDROXY: 21 NG/ML (ref 30–100)
WBC # BLD: 9.3 THOU/MM3 (ref 4.8–10.8)

## 2022-04-27 PROCEDURE — 85027 COMPLETE CBC AUTOMATED: CPT

## 2022-04-27 PROCEDURE — 83036 HEMOGLOBIN GLYCOSYLATED A1C: CPT

## 2022-04-27 PROCEDURE — 84443 ASSAY THYROID STIM HORMONE: CPT

## 2022-04-27 PROCEDURE — 80053 COMPREHEN METABOLIC PANEL: CPT

## 2022-04-27 PROCEDURE — 82306 VITAMIN D 25 HYDROXY: CPT

## 2022-04-27 PROCEDURE — 36415 COLL VENOUS BLD VENIPUNCTURE: CPT

## 2022-04-27 PROCEDURE — 80061 LIPID PANEL: CPT

## 2022-04-28 ENCOUNTER — OFFICE VISIT (OUTPATIENT)
Dept: FAMILY MEDICINE CLINIC | Age: 22
End: 2022-04-28
Payer: MEDICARE

## 2022-04-28 VITALS
HEART RATE: 100 BPM | DIASTOLIC BLOOD PRESSURE: 60 MMHG | RESPIRATION RATE: 20 BRPM | WEIGHT: 178.7 LBS | SYSTOLIC BLOOD PRESSURE: 112 MMHG | BODY MASS INDEX: 28.84 KG/M2

## 2022-04-28 DIAGNOSIS — E55.9 VITAMIN D DEFICIENCY: ICD-10-CM

## 2022-04-28 DIAGNOSIS — F31.60 BIPOLAR 1 DISORDER, MIXED (HCC): Primary | ICD-10-CM

## 2022-04-28 PROCEDURE — 99213 OFFICE O/P EST LOW 20 MIN: CPT | Performed by: NURSE PRACTITIONER

## 2022-04-28 PROCEDURE — G8419 CALC BMI OUT NRM PARAM NOF/U: HCPCS | Performed by: NURSE PRACTITIONER

## 2022-04-28 PROCEDURE — G8427 DOCREV CUR MEDS BY ELIG CLIN: HCPCS | Performed by: NURSE PRACTITIONER

## 2022-04-28 PROCEDURE — 4004F PT TOBACCO SCREEN RCVD TLK: CPT | Performed by: NURSE PRACTITIONER

## 2022-04-28 RX ORDER — QUETIAPINE FUMARATE 50 MG/1
50 TABLET, EXTENDED RELEASE ORAL NIGHTLY
Qty: 30 TABLET | Refills: 0 | Status: SHIPPED | OUTPATIENT
Start: 2022-04-28 | End: 2022-05-26

## 2022-04-28 ASSESSMENT — ENCOUNTER SYMPTOMS
ABDOMINAL PAIN: 0
COUGH: 0
SHORTNESS OF BREATH: 0
NAUSEA: 0

## 2022-04-28 NOTE — PROGRESS NOTES
Subjective:      Patient ID: Alex Stokes 2000 is a 24 y.o. female here for evaluation. HPI: 1 month Follow up    Chief Complaint   Patient presents with    1 Month Follow-Up       Patient Active Problem List   Diagnosis    Severe episode of recurrent major depressive disorder, without psychotic features ([address]    Adjustment disorder with anxious mood    Tobacco dependence syndrome        Long standing mental health. Depression and precious. Was admitted for postpartum depression 1 year ago. Has 17 month old son at home. Will have her highs and lows. Has been on medication in past that caused SE. Last  medication was on Zyprexa but had tolerance issues. Started on Abilify 1 month ago. Easier to calm down but having sleeping issues. Vivid dreams. Failed Abilify and Zyprexa    Denies CP, SOB or chest tightness    Vitals:    04/28/22 1302   BP: 112/60   Pulse: 100   Resp: 20        Labs reviewed.      Lab Results   Component Value Date    LABA1C 4.8 04/27/2022     No results found for: EAG    No components found for: CHLPL  Lab Results   Component Value Date    TRIG 44 04/27/2022     Lab Results   Component Value Date    HDL 55 04/27/2022     Lab Results   Component Value Date    LDLCALC 79 04/27/2022     No results found for: LABVLDL      Chemistry        Component Value Date/Time     04/27/2022 1224    K 4.2 04/27/2022 1224     04/27/2022 1224    CO2 25 04/27/2022 1224    BUN 11 04/27/2022 1224    CREATININE 0.7 04/27/2022 1224        Component Value Date/Time    CALCIUM 9.0 04/27/2022 1224    ALKPHOS 63 04/27/2022 1224    AST 15 04/27/2022 1224    ALT 14 04/27/2022 1224    BILITOT 0.2 (L) 04/27/2022 1224            Lab Results   Component Value Date    TSH 1.410 04/27/2022       Lab Results   Component Value Date    WBC 9.3 04/27/2022    HGB 14.0 04/27/2022    HCT 43.7 04/27/2022    MCV 91.4 04/27/2022     04/27/2022         Health Maintenance   Topic Date Due    Varicella vaccine (1 of 2 - 2-dose childhood series) Never done    Pneumococcal 0-64 years Vaccine (1 - PCV) 09/22/2006    HIV screen  Never done    Hepatitis C screen  Never done    Pap smear  Never done    COVID-19 Vaccine (3 - Booster for Pfizer series) 04/22/2022    Chlamydia screen  03/30/2022    DTaP/Tdap/Td vaccine (6 - Td or Tdap) 08/02/2022    Flu vaccine (Season Ended) 09/01/2022    Depression Monitoring  03/30/2023    Hepatitis B vaccine  Completed    Hib vaccine  Completed    HPV vaccine  Completed    Meningococcal (ACWY) vaccine  Completed    Hepatitis A vaccine  Aged [employer] History   Administered Date(s) Administered    COVID-19, Pfizer Purple top, DILUTE for use, 12+ yrs, 30mcg/0.3mL dose 10/13/2021, 11/22/2021    DTaP vaccine 2000, 04/06/2001, 04/19/2002, 01/13/2003    HPV Quadrivalent (Gardasil) 08/02/2012, 10/04/2012, 02/08/2013    Hepatitis B Ped/Adol (Engerix-B, Recombivax HB) 2000, 2000, 04/06/2001    Hib vaccine 2000, 04/06/2001, 04/19/2002    MMR 04/19/2002, 09/26/2014    Meningococcal MCV4O (Menveo) 02/01/2018    Meningococcal MCV4P (Menactra) 08/02/2012    Pneumococcal Conjugate 7-valent (Prevnar7) 04/06/2001    Polio IPV (IPOL) 2000, 04/06/2001, 04/19/2002    Tdap (Boostrix, Adacel) 08/02/2012       Review of Systems   Constitutional: Negative for chills and fever. HENT: Negative. Respiratory: Negative for cough and shortness of breath. Cardiovascular: Negative for chest pain. Gastrointestinal: Negative for abdominal pain and nausea. Skin: Negative for rash. Neurological: Negative for dizziness, light-headedness and headaches. Psychiatric/Behavioral: Positive for agitation, behavioral problems and dysphoric mood. Negative for sleep disturbance. The patient is not nervous/anxious. Objective:   Physical Exam  Constitutional:       General: She is not in acute distress.   Eyes:      Pupils: Pupils are equal, round, and reactive to light. Cardiovascular:      Rate and Rhythm: Normal rate and regular rhythm. Heart sounds: No murmur heard. Pulmonary:      Effort: Pulmonary effort is normal.      Breath sounds: Normal breath sounds. No wheezing. Abdominal:      General: Bowel sounds are normal. There is no distension. Palpations: Abdomen is soft. Tenderness: There is no abdominal tenderness. Musculoskeletal:         General: No tenderness. Normal range of motion. Cervical back: Normal range of motion and neck supple. Skin:     General: Skin is warm and dry. Findings: No rash. Psychiatric:         Attention and Perception: Attention normal.         Mood and Affect: Mood is depressed. Speech: Speech normal.         Behavior: Behavior is slowed and withdrawn. Thought Content: Thought content normal.         Judgment: Judgment is impulsive. Assessment:       Diagnosis Orders   1. Bipolar 1 disorder, mixed (HCC)  QUEtiapine (SEROQUEL XR) 50 MG extended release tablet   2. Vitamin D deficiency  Cholecalciferol (VITAMIN D3) 125 MCG (5000 UT) CAPS           Plan:      Stop Abilify  Serqquell 50 mg XR  Non-pharm tx discussed  Labs reviewed  Vit D Supplement   RTO in 1 month    Current Outpatient Medications   Medication Sig Dispense Refill    Cholecalciferol (VITAMIN D3) 125 MCG (5000 UT) CAPS Take 1 capsule by mouth daily 90 capsule 3    QUEtiapine (SEROQUEL XR) 50 MG extended release tablet Take 1 tablet by mouth nightly 30 tablet 0    ARIPiprazole (ABILIFY) 5 MG tablet Take 1 tablet by mouth daily 30 tablet 3     No current facility-administered medications for this visit. PHQ-9 score today: ( ), additional evaluation and assessment performed, follow-up plan includes but not limited to: Medication management and Referral to /Specialist  for evaluation and management.

## 2022-04-28 NOTE — PATIENT INSTRUCTIONS
You may receive a survey regarding the care you received during your visit. Your input is valuable to us. We encourage you to complete and return your survey. We hope you will choose us in the future for your healthcare needs. Patient Education        Learning About Vitamin D  Why is it important to get enough vitamin D? Your body needs vitamin D to absorb calcium. Calcium keeps your bones and muscles, including your heart, healthy and strong. If your muscles don't get enough calcium, they can cramp, hurt, or feel weak. You may have long-term(chronic) muscle aches and pains. If you don't get enough vitamin D throughout life, you have an increased chance of having thin and brittle bones (osteoporosis) in your later years. Children who don't get enough vitamin D may not grow as much as others their age. They also have a chance of getting a rare disease called rickets. It causes weakbones. Vitamin D and calcium are added to many foods. And your body uses sunshine tomake its own vitamin D. How much vitamin D do you need? The recommended dietary allowance (RDA) for vitamin D is 600 IU (international units) every day for people ages 3 through 79. Adults 71 and older need 800 IUevery day. How can you get more vitamin D? Foods that contain vitamin D include:   [place], tuna, and mackerel. These are some of the best foods to eat when you need to get more vitamin D.   Cheese, egg yolks, and beef liver. These foods have vitamin D in small amounts.  Milk, soy drinks, orange juice, yogurt, margarine, and some kinds of cereal have vitamin D added to them. Some people don't make vitamin D as well as others. They may have to take extracare in getting enough vitamin D. Things that reduce how much vitamin D your body makes include:   Having dark skin.  Age, especially if you are older than 72.  Digestive problems, such as Crohn's or celiac disease.  Liver and kidney disease.   Some people who do not get enough vitamin D may need supplements. Are there any risks from taking vitamin D?   Too much vitamin D:  ? Can damage your kidneys. ? Can cause nausea and vomiting, constipation, and weakness. ? Raises the amount of calcium in your blood. If this happens, you can get confused or have an irregular heart rhythm.  Vitamin D may interact with other medicines. Tell your doctor about all of the medicines you take, including over-the-counter drugs, herbs, and pills. Tell your doctor about all of your current medical problems. Where can you learn more? Go to [email or web address] and sign in to your [employer] account. Enter [id number] in the [employer] box to learn more about \"Learning About Vitamin D. \"     If you do not have an account, please click on the \"Sign Up Now\" link. Current as of: September 8, 2021               Content Version: 13.2  © 3119-8160 [employer]. Care instructions adapted under license by [facility] [address] If you have questions about a medical condition or this instruction, always ask your healthcare professional. [address] any warranty or liability for your use of this information.

## 2022-05-04 ENCOUNTER — PATIENT MESSAGE (OUTPATIENT)
Dept: FAMILY MEDICINE CLINIC | Age: 22
End: 2022-05-04

## 2022-05-04 DIAGNOSIS — F31.60 BIPOLAR 1 DISORDER, MIXED (HCC): Primary | ICD-10-CM

## 2022-05-04 NOTE — TELEPHONE ENCOUNTER
From: Dayton Buchanan  To: Chelsea Rogel  Sent: 5/4/2022 9:55 AM EDT  Subject: my new meds    im having an extremely hard time adjusting to my meds, im at the point where i want to literally die. its taking me everything to get up in the mornings these last couple days.  i feel so defeated

## 2022-05-26 ENCOUNTER — OFFICE VISIT (OUTPATIENT)
Dept: FAMILY MEDICINE CLINIC | Age: 22
End: 2022-05-26
Payer: MEDICARE

## 2022-05-26 VITALS
DIASTOLIC BLOOD PRESSURE: 72 MMHG | RESPIRATION RATE: 16 BRPM | SYSTOLIC BLOOD PRESSURE: 104 MMHG | BODY MASS INDEX: 28.81 KG/M2 | WEIGHT: 178.5 LBS | HEART RATE: 76 BPM

## 2022-05-26 DIAGNOSIS — F31.60 BIPOLAR 1 DISORDER, MIXED (HCC): Primary | ICD-10-CM

## 2022-05-26 PROBLEM — S80.12XA CONTUSION OF KNEE AND LOWER LEG, LEFT, INITIAL ENCOUNTER: Status: ACTIVE | Noted: 2021-09-04

## 2022-05-26 PROBLEM — S80.02XA CONTUSION OF KNEE AND LOWER LEG, LEFT, INITIAL ENCOUNTER: Status: ACTIVE | Noted: 2021-09-04

## 2022-05-26 PROCEDURE — G8419 CALC BMI OUT NRM PARAM NOF/U: HCPCS | Performed by: NURSE PRACTITIONER

## 2022-05-26 PROCEDURE — 4004F PT TOBACCO SCREEN RCVD TLK: CPT | Performed by: NURSE PRACTITIONER

## 2022-05-26 PROCEDURE — G8427 DOCREV CUR MEDS BY ELIG CLIN: HCPCS | Performed by: NURSE PRACTITIONER

## 2022-05-26 PROCEDURE — 99213 OFFICE O/P EST LOW 20 MIN: CPT | Performed by: NURSE PRACTITIONER

## 2022-05-26 RX ORDER — VENLAFAXINE HYDROCHLORIDE 75 MG/1
75 CAPSULE, EXTENDED RELEASE ORAL DAILY
Qty: 30 CAPSULE | Refills: 0 | Status: SHIPPED | OUTPATIENT
Start: 2022-05-26 | End: 2022-06-23 | Stop reason: SDUPTHER

## 2022-05-26 RX ORDER — CARBAMAZEPINE 200 MG/1
200 TABLET ORAL NIGHTLY
Qty: 30 TABLET | Refills: 0 | Status: SHIPPED | OUTPATIENT
Start: 2022-05-26 | End: 2022-06-23 | Stop reason: SDUPTHER

## 2022-05-26 ASSESSMENT — ENCOUNTER SYMPTOMS
NAUSEA: 0
COUGH: 0
SHORTNESS OF BREATH: 0
ABDOMINAL PAIN: 0

## 2022-05-26 NOTE — PROGRESS NOTES
Subjective:      Patient ID: Precious Quezada 2000 is a 24 y.o. female here for evaluation. HPI: 1 month Follow up    Chief Complaint   Patient presents with    1 Month Follow-Up       Patient Active Problem List   Diagnosis    Severe episode of recurrent major depressive disorder, without psychotic features ([address]    Adjustment disorder with anxious mood    Tobacco dependence syndrome    Contusion of knee and lower leg, left, initial encounter        Long standing mental health. Depression and precious. Was admitted for postpartum depression 1 year ago. Has 17 month old son at home. Will have her highs and lows. Has been on medication in past that caused SE. Last  medication was on Zyprexa but had tolerance issues. Failed Abilify, Seroquel, Latuda and Zyprexa due to intolerance    Was on Effexor and tegretol 200 mg in 2017 that she felt the best on. Was taken off at that time due to dad not wanting her on any medications. Denies CP, SOB or chest tightness    Vitals:    05/26/22 1310   BP: 104/72   Pulse: 76   Resp: 16        Labs reviewed.      Lab Results   Component Value Date    LABA1C 4.8 04/27/2022     No results found for: EAG    No components found for: CHLPL  Lab Results   Component Value Date    TRIG 44 04/27/2022     Lab Results   Component Value Date    HDL 55 04/27/2022     Lab Results   Component Value Date    LDLCALC 79 04/27/2022     No results found for: LABVLDL      Chemistry        Component Value Date/Time     04/27/2022 1224    K 4.2 04/27/2022 1224     04/27/2022 1224    CO2 25 04/27/2022 1224    BUN 11 04/27/2022 1224    CREATININE 0.7 04/27/2022 1224        Component Value Date/Time    CALCIUM 9.0 04/27/2022 1224    ALKPHOS 63 04/27/2022 1224    AST 15 04/27/2022 1224    ALT 14 04/27/2022 1224    BILITOT 0.2 (L) 04/27/2022 1224            Lab Results   Component Value Date    TSH 1.410 04/27/2022       Lab Results   Component Value Date    WBC 9.3 04/27/2022    HGB 14.0 04/27/2022    HCT 43.7 04/27/2022    MCV 91.4 04/27/2022     04/27/2022         Health Maintenance   Topic Date Due    Varicella vaccine (1 of 2 - 2-dose childhood series) Never done    Pneumococcal 0-64 years Vaccine (1 - PCV) 09/22/2006    HIV screen  Never done    Hepatitis C screen  Never done    Pap smear  Never done    Chlamydia screen  03/30/2022    COVID-19 Vaccine (3 - Booster for Pfizer series) 04/22/2022    DTaP/Tdap/Td vaccine (6 - Td or Tdap) 08/02/2022    Flu vaccine (Season Ended) 09/01/2022    Depression Monitoring  03/30/2023    Hepatitis B vaccine  Completed    Hib vaccine  Completed    HPV vaccine  Completed    Meningococcal (ACWY) vaccine  Completed    Hepatitis A vaccine  Aged [employer] History   Administered Date(s) Administered    COVID-19, Pfizer Purple top, DILUTE for use, 12+ yrs, 30mcg/0.3mL dose 10/13/2021, 11/22/2021    DTaP vaccine 2000, 04/06/2001, 04/19/2002, 01/13/2003    HPV Quadrivalent (Gardasil) 08/02/2012, 10/04/2012, 02/08/2013    Hepatitis B Ped/Adol (Engerix-B, Recombivax HB) 2000, 2000, 04/06/2001    Hib vaccine 2000, 04/06/2001, 04/19/2002    MMR 04/19/2002, 09/26/2014    Meningococcal MCV4O (Menveo) 02/01/2018    Meningococcal MCV4P (Menactra) 08/02/2012    Pneumococcal Conjugate 7-valent (Prevnar7) 04/06/2001    Polio IPV (IPOL) 2000, 04/06/2001, 04/19/2002    Tdap (Boostrix, Adacel) 08/02/2012       Review of Systems   Constitutional: Negative for chills and fever. HENT: Negative. Respiratory: Negative for cough and shortness of breath. Cardiovascular: Negative for chest pain. Gastrointestinal: Negative for abdominal pain and nausea. Skin: Negative for rash. Neurological: Negative for dizziness, light-headedness and headaches. Psychiatric/Behavioral: Positive for agitation, behavioral problems and dysphoric mood. Negative for sleep disturbance.  The patient is not nervous/anxious. Objective:   Physical Exam  Constitutional:       General: She is not in acute distress. Eyes:      Pupils: Pupils are equal, round, and reactive to light. Cardiovascular:      Rate and Rhythm: Normal rate and regular rhythm. Heart sounds: No murmur heard. Pulmonary:      Effort: Pulmonary effort is normal.      Breath sounds: Normal breath sounds. No wheezing. Abdominal:      General: Bowel sounds are normal. There is no distension. Palpations: Abdomen is soft. Tenderness: There is no abdominal tenderness. Musculoskeletal:         General: No tenderness. Normal range of motion. Cervical back: Normal range of motion and neck supple. Skin:     General: Skin is warm and dry. Findings: No rash. Psychiatric:         Attention and Perception: Attention normal.         Mood and Affect: Mood is depressed. Speech: Speech normal.         Behavior: Behavior is slowed and withdrawn. Thought Content: Thought content normal.         Judgment: Judgment is impulsive. Assessment:       Diagnosis Orders   1. Bipolar 1 disorder, mixed (HCC)  venlafaxine (EFFEXOR XR) 75 MG extended release capsule    carBAMazepine (TEGRETOL) 200 mg tablet           Plan:      Start Effexor 75 mg Daily  Start Tegretol 200 mg HS  Non-pharm discussed  RTO in 1 month      Current Outpatient Medications   Medication Sig Dispense Refill    venlafaxine (EFFEXOR XR) 75 MG extended release capsule Take 1 capsule by mouth daily 30 capsule 0    carBAMazepine (TEGRETOL) 200 mg tablet Take 1 tablet by mouth at bedtime 30 tablet 0    Cholecalciferol (VITAMIN D3) 125 MCG (5000 UT) CAPS Take 1 capsule by mouth daily 90 capsule 3     No current facility-administered medications for this visit.           PHQ-9 score today: ( ), additional evaluation and assessment performed, follow-up plan includes but not limited to: Medication management and Referral to /Specialist for evaluation and management.

## 2022-05-26 NOTE — PATIENT INSTRUCTIONS
You may receive a survey regarding the care you received during your visit. Your input is valuable to us. We encourage you to complete and return your survey. We hope you will choose us in the future for your healthcare needs. Tobacco Cessation Programs     Telephonic behavior modification  Mariely Shah ([phone])   Counseling service for those who are ready to quit using tobacco.     Available for uninsured [place] residents, [place] recipients, pregnant women, or patients whose health plans or employers are members of the [address] [place] [address] behavior modification   http://[place]. Quitlogix. org   Online support program to help patients through each step of the quitting process. Available 24 hours a day 7 days a week. Provides up to date researched based tool, step-by-step guides, and motivational messages. Online behavior modification   www.lungusa.org/stop-smoking/how-to-quit   HelpLine: 1-800-LUNG-[place] (592-0237)   Email questions to:  Nima@[employer]. [employer]    Website offers resources to help tobacco users figure out their reasons for quitting and then take the big step of quitting for good. Hypnosis   [facility]: [facility] [place]   Contact: Fadi Ayala, PhD at [phone]   Hypnosis for tobacco cessation   Cost $225 for the initial session and $175 for each session afterwards. Most patients require 6-8 sessions. There is the option to submit through the patients insurance. Hypnosis and behavior modification   Location: [place] [address],  [address] [place], [place]   Contact: Kaia Xiong, PhD at [phone]  [facility] and hypnosis for nicotine addition   Cost: For uninsured patients:  Please call above phone number  Cost for insured patients depends on patients insurance plan.     Behavior modification   Location: [facility], [address] [place], [address]: [place] [address] include four one-on-one appointments between the patient and a respiratory therapist.  The four appointments span over three weeks. The respiratory therapist schedules one of the appointments to occur 48 hours after the patients quit date.  Cost $100 total for the four sessions.   Tobacco cessation products are not included in the cost and are not provided by [facility].     Isael Matias

## 2022-06-02 DIAGNOSIS — E55.9 VITAMIN D DEFICIENCY: ICD-10-CM

## 2022-06-23 ENCOUNTER — TELEMEDICINE (OUTPATIENT)
Dept: FAMILY MEDICINE CLINIC | Age: 22
End: 2022-06-23
Payer: MEDICARE

## 2022-06-23 DIAGNOSIS — F31.60 BIPOLAR 1 DISORDER, MIXED (HCC): Primary | ICD-10-CM

## 2022-06-23 DIAGNOSIS — Z30.011 ENCOUNTER FOR INITIAL PRESCRIPTION OF CONTRACEPTIVE PILLS: ICD-10-CM

## 2022-06-23 PROCEDURE — G8427 DOCREV CUR MEDS BY ELIG CLIN: HCPCS | Performed by: NURSE PRACTITIONER

## 2022-06-23 PROCEDURE — 99213 OFFICE O/P EST LOW 20 MIN: CPT | Performed by: NURSE PRACTITIONER

## 2022-06-23 RX ORDER — CARBAMAZEPINE 200 MG/1
200 TABLET ORAL 2 TIMES DAILY
Qty: 60 TABLET | Refills: 2 | Status: SHIPPED | OUTPATIENT
Start: 2022-06-23 | End: 2022-07-23 | Stop reason: SDUPTHER

## 2022-06-23 RX ORDER — VENLAFAXINE HYDROCHLORIDE 75 MG/1
75 CAPSULE, EXTENDED RELEASE ORAL DAILY
Qty: 30 CAPSULE | Refills: 2 | Status: SHIPPED | OUTPATIENT
Start: 2022-06-23 | End: 2022-07-23 | Stop reason: SDUPTHER

## 2022-06-23 RX ORDER — NORETHINDRONE ACETATE AND ETHINYL ESTRADIOL 1MG-20(21)
1 KIT ORAL DAILY
Qty: 1 PACKET | Refills: 5 | Status: SHIPPED | OUTPATIENT
Start: 2022-06-23 | End: 2022-07-23 | Stop reason: SDUPTHER

## 2022-06-23 ASSESSMENT — ENCOUNTER SYMPTOMS
ABDOMINAL PAIN: 0
COUGH: 0
SHORTNESS OF BREATH: 0
NAUSEA: 0

## 2022-06-23 NOTE — PROGRESS NOTES
Subjective:      Patient ID: Alex Stokes is a 24 y.o. female    HPI: 1 month Follow Up    TELEHEALTH EVALUATION -- Audio/Visual ([facility])    Patient identification was verified at the start of the visit: Yes    Services were provided through a video synchronous discussion virtually to substitute for in-person clinic visit. Patient and provider were located at their individual homes. Patient has requested an audio/video evaluation for the following concern(s):    No chief complaint on file. Long standing mental health. Depression and precious. Was admitted for postpartum depression 1 year ago. Has 17 month old son at home. Will have her highs and lows. Has been on medication in past that caused SE. Last  medication was on Zyprexa but had tolerance issues. Failed Abilify, Seroquel, Latuda and Zyprexa due to intolerance     Was started on Effexor 75 mg Daily and Tegretol 200 mg HS due to hx of benefit when she was younger. Tolerating well. Benefit with leveling out her mood. Still moments of ups and downs.      Denies CP, SOB or chest tightness    Patient Active Problem List   Diagnosis    Severe episode of recurrent major depressive disorder, without psychotic features ([facility] [address]    Adjustment disorder with anxious mood    Tobacco dependence syndrome    Contusion of knee and lower leg, left, initial encounter       Was given DEPO shot for oral contraceptive - issues with weight gain. Periods regular. 1 child. Smoker. No family hx of clots. Review of Systems   Constitutional: Negative for chills and fever. HENT: Negative. Respiratory: Negative for cough and shortness of breath. Cardiovascular: Negative for chest pain. Gastrointestinal: Negative for abdominal pain and nausea. Genitourinary: Negative for menstrual problem. Skin: Negative for rash. Neurological: Negative for dizziness, light-headedness and headaches. Psychiatric/Behavioral: Positive for agitation, behavioral problems and dysphoric mood. Negative for sleep disturbance. The patient is not nervous/anxious. Objective:   Physical Exam  Constitutional:       General: She is not in acute distress. Appearance: She is not ill-appearing. Pulmonary:      Effort: Pulmonary effort is normal. No respiratory distress. Neurological:      Mental Status: She is alert and oriented to person, place, and time. Psychiatric:         Mood and Affect: Mood normal.         Behavior: Behavior normal.         Assessment:       Diagnosis Orders   1. Bipolar 1 disorder, mixed (HCC)  carBAMazepine (TEGRETOL) 200 MG tablet    venlafaxine (EFFEXOR XR) 75 MG extended release capsule   2. Encounter for initial prescription of contraceptive pills  norethindrone-ethinyl estradiol (LOESTRIN FE 1/20) 1-20 MG-MCG per tablet       Plan:     Increase tegretol 200 mg BID  Continue Effexor 75 mg  Rsks of OC discussed especially with smoking  OC Daily  Follow up with OBGYN  RTO in 3 months      Patsy Seen, was evaluated through a synchronous (real-time) audio-video encounter. The patient (or guardian if applicable) is aware that this is a billable service, which includes applicable co-pays. This Virtual Visit was conducted with patient's (and/or legal guardian's) consent. The visit was conducted pursuant to the emergency declaration under the [address] [place] [address], [address] [place] [address] authority and the [employer] and [employer] General Act. Patient identification was verified, and a caregiver was present when appropriate. The patient was located at Home: [address] [place] [address]. Provider was located at [facility] (Appt Dept): [address] [facility] [address] [facility] [place],  [address]         Total time spent for this encounter: Not billed by time    --DULCE Heller CNP on 6/23/2022 at 12:37 PM    An electronic signature was used to authenticate this note.

## 2022-07-10 DIAGNOSIS — E55.9 VITAMIN D DEFICIENCY: ICD-10-CM

## 2022-07-23 DIAGNOSIS — F31.60 BIPOLAR 1 DISORDER, MIXED (HCC): ICD-10-CM

## 2022-07-23 DIAGNOSIS — Z30.011 ENCOUNTER FOR INITIAL PRESCRIPTION OF CONTRACEPTIVE PILLS: ICD-10-CM

## 2022-07-25 RX ORDER — NORETHINDRONE ACETATE AND ETHINYL ESTRADIOL 1MG-20(21)
1 KIT ORAL DAILY
Qty: 1 PACKET | Refills: 5 | Status: SHIPPED | OUTPATIENT
Start: 2022-07-25 | End: 2022-08-22 | Stop reason: SDUPTHER

## 2022-07-25 RX ORDER — CARBAMAZEPINE 200 MG/1
200 TABLET ORAL 2 TIMES DAILY
Qty: 180 TABLET | Refills: 1 | Status: SHIPPED | OUTPATIENT
Start: 2022-07-25 | End: 2022-08-22

## 2022-07-25 RX ORDER — VENLAFAXINE HYDROCHLORIDE 150 MG/1
150 CAPSULE, EXTENDED RELEASE ORAL DAILY
Qty: 90 CAPSULE | Refills: 1 | Status: SHIPPED | OUTPATIENT
Start: 2022-07-25 | End: 2022-08-22 | Stop reason: SDUPTHER

## 2022-08-10 DIAGNOSIS — E55.9 VITAMIN D DEFICIENCY: ICD-10-CM

## 2022-08-20 ENCOUNTER — PATIENT MESSAGE (OUTPATIENT)
Dept: FAMILY MEDICINE CLINIC | Age: 22
End: 2022-08-20

## 2022-08-22 DIAGNOSIS — Z30.011 ENCOUNTER FOR INITIAL PRESCRIPTION OF CONTRACEPTIVE PILLS: ICD-10-CM

## 2022-08-22 DIAGNOSIS — F31.60 BIPOLAR 1 DISORDER, MIXED (HCC): ICD-10-CM

## 2022-08-22 RX ORDER — LAMOTRIGINE 100 MG/1
50 TABLET ORAL DAILY
Qty: 30 TABLET | Refills: 0 | Status: SHIPPED | OUTPATIENT
Start: 2022-08-22 | End: 2022-09-20 | Stop reason: SDUPTHER

## 2022-08-22 RX ORDER — VENLAFAXINE HYDROCHLORIDE 150 MG/1
150 CAPSULE, EXTENDED RELEASE ORAL DAILY
Qty: 90 CAPSULE | Refills: 1 | Status: SHIPPED | OUTPATIENT
Start: 2022-08-22 | End: 2022-09-22 | Stop reason: SDUPTHER

## 2022-08-22 RX ORDER — NORETHINDRONE ACETATE AND ETHINYL ESTRADIOL 1MG-20(21)
1 KIT ORAL DAILY
Qty: 1 PACKET | Refills: 5 | Status: SHIPPED | OUTPATIENT
Start: 2022-08-22 | End: 2022-08-23 | Stop reason: SDUPTHER

## 2022-08-22 NOTE — TELEPHONE ENCOUNTER
From: Humphrey Kaplan  To: Nathan Jailene  Sent: 8/20/2022 4:20 PM EDT  Subject: meds    could we look into a different medication, im having awful episodes that i cant control, ill just keep going through all these moods until i finally crash.  im just not sure whats going on,

## 2022-08-23 DIAGNOSIS — Z30.011 ENCOUNTER FOR INITIAL PRESCRIPTION OF CONTRACEPTIVE PILLS: ICD-10-CM

## 2022-08-24 RX ORDER — NORETHINDRONE ACETATE AND ETHINYL ESTRADIOL 1MG-20(21)
1 KIT ORAL DAILY
Qty: 1 PACKET | Refills: 5 | Status: SHIPPED | OUTPATIENT
Start: 2022-08-24 | End: 2022-09-20 | Stop reason: SDUPTHER

## 2022-09-20 DIAGNOSIS — F31.60 BIPOLAR 1 DISORDER, MIXED (HCC): ICD-10-CM

## 2022-09-20 DIAGNOSIS — Z30.011 ENCOUNTER FOR INITIAL PRESCRIPTION OF CONTRACEPTIVE PILLS: ICD-10-CM

## 2022-09-21 RX ORDER — NORETHINDRONE ACETATE AND ETHINYL ESTRADIOL 1MG-20(21)
1 KIT ORAL DAILY
Qty: 1 PACKET | Refills: 11 | Status: SHIPPED | OUTPATIENT
Start: 2022-09-21 | End: 2022-10-23 | Stop reason: SDUPTHER

## 2022-09-21 RX ORDER — LAMOTRIGINE 100 MG/1
100 TABLET ORAL DAILY
Qty: 30 TABLET | Refills: 5 | Status: SHIPPED | OUTPATIENT
Start: 2022-09-21 | End: 2022-10-23 | Stop reason: SDUPTHER

## 2022-09-22 DIAGNOSIS — F31.60 BIPOLAR 1 DISORDER, MIXED (HCC): ICD-10-CM

## 2022-09-23 RX ORDER — VENLAFAXINE HYDROCHLORIDE 150 MG/1
150 CAPSULE, EXTENDED RELEASE ORAL DAILY
Qty: 90 CAPSULE | Refills: 1 | Status: SHIPPED | OUTPATIENT
Start: 2022-09-23 | End: 2022-10-23 | Stop reason: SDUPTHER

## 2022-10-23 DIAGNOSIS — Z30.011 ENCOUNTER FOR INITIAL PRESCRIPTION OF CONTRACEPTIVE PILLS: ICD-10-CM

## 2022-10-23 DIAGNOSIS — F31.60 BIPOLAR 1 DISORDER, MIXED (HCC): ICD-10-CM

## 2022-10-24 RX ORDER — LAMOTRIGINE 100 MG/1
100 TABLET ORAL DAILY
Qty: 30 TABLET | Refills: 11 | Status: SHIPPED | OUTPATIENT
Start: 2022-10-24 | End: 2022-11-28 | Stop reason: SDUPTHER

## 2022-10-24 RX ORDER — NORETHINDRONE ACETATE AND ETHINYL ESTRADIOL 1MG-20(21)
1 KIT ORAL DAILY
Qty: 1 PACKET | Refills: 11 | Status: SHIPPED | OUTPATIENT
Start: 2022-10-24 | End: 2022-11-28 | Stop reason: SDUPTHER

## 2022-10-24 RX ORDER — VENLAFAXINE HYDROCHLORIDE 150 MG/1
150 CAPSULE, EXTENDED RELEASE ORAL DAILY
Qty: 30 CAPSULE | Refills: 11 | Status: SHIPPED | OUTPATIENT
Start: 2022-10-24 | End: 2022-11-28 | Stop reason: SDUPTHER

## 2022-11-14 DIAGNOSIS — E55.9 VITAMIN D DEFICIENCY: ICD-10-CM

## 2022-11-28 DIAGNOSIS — F31.60 BIPOLAR 1 DISORDER, MIXED (HCC): ICD-10-CM

## 2022-11-28 DIAGNOSIS — Z30.011 ENCOUNTER FOR INITIAL PRESCRIPTION OF CONTRACEPTIVE PILLS: ICD-10-CM

## 2022-11-29 RX ORDER — VENLAFAXINE HYDROCHLORIDE 150 MG/1
150 CAPSULE, EXTENDED RELEASE ORAL DAILY
Qty: 30 CAPSULE | Refills: 11 | Status: SHIPPED | OUTPATIENT
Start: 2022-11-29

## 2022-11-29 RX ORDER — NORETHINDRONE ACETATE AND ETHINYL ESTRADIOL 1MG-20(21)
1 KIT ORAL DAILY
Qty: 1 PACKET | Refills: 11 | Status: SHIPPED | OUTPATIENT
Start: 2022-11-29

## 2022-11-29 RX ORDER — LAMOTRIGINE 100 MG/1
100 TABLET ORAL DAILY
Qty: 30 TABLET | Refills: 11 | Status: SHIPPED | OUTPATIENT
Start: 2022-11-29

## 2022-12-15 ENCOUNTER — PATIENT MESSAGE (OUTPATIENT)
Dept: FAMILY MEDICINE CLINIC | Age: 22
End: 2022-12-15

## 2022-12-15 DIAGNOSIS — F31.60 BIPOLAR 1 DISORDER, MIXED (HCC): ICD-10-CM

## 2022-12-16 RX ORDER — LAMOTRIGINE 100 MG/1
100 TABLET ORAL 2 TIMES DAILY
Qty: 30 TABLET | Refills: 11 | Status: SHIPPED
Start: 2022-12-16

## 2022-12-16 NOTE — TELEPHONE ENCOUNTER
Mojgan, Processor 12/15/2022 4:43 PM EST    i dont have one shwetha, & no everythings been the same.  theres really no stresses rn other than just having a toddler

## 2022-12-29 DIAGNOSIS — E55.9 VITAMIN D DEFICIENCY: ICD-10-CM

## 2022-12-29 DIAGNOSIS — F31.60 BIPOLAR 1 DISORDER, MIXED (HCC): ICD-10-CM

## 2022-12-29 DIAGNOSIS — Z30.011 ENCOUNTER FOR INITIAL PRESCRIPTION OF CONTRACEPTIVE PILLS: ICD-10-CM

## 2022-12-30 RX ORDER — NORETHINDRONE ACETATE AND ETHINYL ESTRADIOL 1MG-20(21)
1 KIT ORAL DAILY
Qty: 1 PACKET | Refills: 11 | Status: SHIPPED | OUTPATIENT
Start: 2022-12-30 | End: 2023-01-31 | Stop reason: SDUPTHER

## 2022-12-30 RX ORDER — VENLAFAXINE HYDROCHLORIDE 150 MG/1
150 CAPSULE, EXTENDED RELEASE ORAL DAILY
Qty: 30 CAPSULE | Refills: 11 | Status: SHIPPED | OUTPATIENT
Start: 2022-12-30 | End: 2023-01-31 | Stop reason: SDUPTHER

## 2022-12-30 RX ORDER — LAMOTRIGINE 100 MG/1
100 TABLET ORAL 2 TIMES DAILY
Qty: 60 TABLET | Refills: 11 | Status: SHIPPED | OUTPATIENT
Start: 2022-12-30 | End: 2023-01-31 | Stop reason: SDUPTHER

## 2023-01-31 DIAGNOSIS — E55.9 VITAMIN D DEFICIENCY: ICD-10-CM

## 2023-01-31 DIAGNOSIS — F31.60 BIPOLAR 1 DISORDER, MIXED (HCC): ICD-10-CM

## 2023-01-31 DIAGNOSIS — Z30.011 ENCOUNTER FOR INITIAL PRESCRIPTION OF CONTRACEPTIVE PILLS: ICD-10-CM

## 2023-01-31 RX ORDER — VENLAFAXINE HYDROCHLORIDE 150 MG/1
150 CAPSULE, EXTENDED RELEASE ORAL DAILY
Qty: 30 CAPSULE | Refills: 11 | Status: SHIPPED | OUTPATIENT
Start: 2023-01-31

## 2023-01-31 RX ORDER — LAMOTRIGINE 100 MG/1
100 TABLET ORAL 2 TIMES DAILY
Qty: 60 TABLET | Refills: 11 | Status: SHIPPED | OUTPATIENT
Start: 2023-01-31

## 2023-01-31 RX ORDER — NORETHINDRONE ACETATE AND ETHINYL ESTRADIOL 1MG-20(21)
1 KIT ORAL DAILY
Qty: 1 PACKET | Refills: 11 | Status: SHIPPED | OUTPATIENT
Start: 2023-01-31

## 2023-03-12 DIAGNOSIS — E55.9 VITAMIN D DEFICIENCY: ICD-10-CM

## 2023-03-12 DIAGNOSIS — F31.60 BIPOLAR 1 DISORDER, MIXED (HCC): ICD-10-CM

## 2023-03-12 DIAGNOSIS — Z30.011 ENCOUNTER FOR INITIAL PRESCRIPTION OF CONTRACEPTIVE PILLS: ICD-10-CM

## 2023-03-13 RX ORDER — VENLAFAXINE HYDROCHLORIDE 150 MG/1
150 CAPSULE, EXTENDED RELEASE ORAL DAILY
Qty: 30 CAPSULE | Refills: 11 | Status: SHIPPED | OUTPATIENT
Start: 2023-03-13

## 2023-03-13 RX ORDER — NORETHINDRONE ACETATE AND ETHINYL ESTRADIOL 1MG-20(21)
1 KIT ORAL DAILY
Qty: 1 PACKET | Refills: 11 | Status: SHIPPED | OUTPATIENT
Start: 2023-03-13

## 2023-03-13 RX ORDER — LAMOTRIGINE 100 MG/1
100 TABLET ORAL 2 TIMES DAILY
Qty: 60 TABLET | Refills: 11 | Status: SHIPPED | OUTPATIENT
Start: 2023-03-13

## 2023-05-25 DIAGNOSIS — E55.9 VITAMIN D DEFICIENCY: ICD-10-CM

## 2023-05-25 DIAGNOSIS — F31.60 BIPOLAR 1 DISORDER, MIXED (HCC): ICD-10-CM

## 2023-05-26 RX ORDER — LAMOTRIGINE 100 MG/1
100 TABLET ORAL 2 TIMES DAILY
Qty: 60 TABLET | Refills: 11 | Status: SHIPPED | OUTPATIENT
Start: 2023-05-26

## 2023-05-26 RX ORDER — VENLAFAXINE HYDROCHLORIDE 150 MG/1
150 CAPSULE, EXTENDED RELEASE ORAL DAILY
Qty: 30 CAPSULE | Refills: 11 | Status: SHIPPED | OUTPATIENT
Start: 2023-05-26

## 2023-06-30 DIAGNOSIS — F31.60 BIPOLAR 1 DISORDER, MIXED (HCC): ICD-10-CM

## 2023-06-30 DIAGNOSIS — E55.9 VITAMIN D DEFICIENCY: ICD-10-CM

## 2023-07-03 RX ORDER — LAMOTRIGINE 100 MG/1
100 TABLET ORAL 2 TIMES DAILY
Qty: 60 TABLET | Refills: 11 | Status: SHIPPED | OUTPATIENT
Start: 2023-07-03

## 2023-07-03 RX ORDER — VENLAFAXINE HYDROCHLORIDE 150 MG/1
150 CAPSULE, EXTENDED RELEASE ORAL DAILY
Qty: 30 CAPSULE | Refills: 11 | Status: SHIPPED | OUTPATIENT
Start: 2023-07-03

## 2023-08-06 DIAGNOSIS — E55.9 VITAMIN D DEFICIENCY: ICD-10-CM

## 2023-08-06 DIAGNOSIS — F31.60 BIPOLAR 1 DISORDER, MIXED (HCC): ICD-10-CM

## 2023-08-07 RX ORDER — LAMOTRIGINE 100 MG/1
100 TABLET ORAL 2 TIMES DAILY
Qty: 60 TABLET | Refills: 11 | Status: SHIPPED | OUTPATIENT
Start: 2023-08-07

## 2023-08-07 RX ORDER — VENLAFAXINE HYDROCHLORIDE 150 MG/1
150 CAPSULE, EXTENDED RELEASE ORAL DAILY
Qty: 30 CAPSULE | Refills: 11 | Status: SHIPPED | OUTPATIENT
Start: 2023-08-07

## 2024-10-30 ENCOUNTER — OFFICE VISIT (OUTPATIENT)
Dept: FAMILY MEDICINE CLINIC | Age: 24
End: 2024-10-30

## 2024-10-30 VITALS
HEART RATE: 76 BPM | DIASTOLIC BLOOD PRESSURE: 70 MMHG | BODY MASS INDEX: 33.03 KG/M2 | HEIGHT: 66 IN | SYSTOLIC BLOOD PRESSURE: 120 MMHG | RESPIRATION RATE: 16 BRPM | WEIGHT: 205.5 LBS

## 2024-10-30 DIAGNOSIS — R07.89 ATYPICAL CHEST PAIN: Primary | ICD-10-CM

## 2024-10-30 DIAGNOSIS — R11.0 NAUSEA: ICD-10-CM

## 2024-10-30 DIAGNOSIS — R05.3 CHRONIC COUGH: ICD-10-CM

## 2024-10-30 RX ORDER — OMEPRAZOLE 40 MG/1
40 CAPSULE, DELAYED RELEASE ORAL
Qty: 30 CAPSULE | Refills: 0 | Status: SHIPPED | OUTPATIENT
Start: 2024-10-30

## 2024-10-30 SDOH — ECONOMIC STABILITY: FOOD INSECURITY: WITHIN THE PAST 12 MONTHS, YOU WORRIED THAT YOUR FOOD WOULD RUN OUT BEFORE YOU GOT MONEY TO BUY MORE.: NEVER TRUE

## 2024-10-30 SDOH — ECONOMIC STABILITY: FOOD INSECURITY: WITHIN THE PAST 12 MONTHS, THE FOOD YOU BOUGHT JUST DIDN'T LAST AND YOU DIDN'T HAVE MONEY TO GET MORE.: NEVER TRUE

## 2024-10-30 SDOH — ECONOMIC STABILITY: INCOME INSECURITY: HOW HARD IS IT FOR YOU TO PAY FOR THE VERY BASICS LIKE FOOD, HOUSING, MEDICAL CARE, AND HEATING?: NOT HARD AT ALL

## 2024-10-30 ASSESSMENT — PATIENT HEALTH QUESTIONNAIRE - PHQ9
10. IF YOU CHECKED OFF ANY PROBLEMS, HOW DIFFICULT HAVE THESE PROBLEMS MADE IT FOR YOU TO DO YOUR WORK, TAKE CARE OF THINGS AT HOME, OR GET ALONG WITH OTHER PEOPLE: NOT DIFFICULT AT ALL
SUM OF ALL RESPONSES TO PHQ QUESTIONS 1-9: 0
9. THOUGHTS THAT YOU WOULD BE BETTER OFF DEAD, OR OF HURTING YOURSELF: NOT AT ALL
6. FEELING BAD ABOUT YOURSELF - OR THAT YOU ARE A FAILURE OR HAVE LET YOURSELF OR YOUR FAMILY DOWN: NOT AT ALL
SUM OF ALL RESPONSES TO PHQ QUESTIONS 1-9: 0
SUM OF ALL RESPONSES TO PHQ QUESTIONS 1-9: 0
1. LITTLE INTEREST OR PLEASURE IN DOING THINGS: NOT AT ALL
5. POOR APPETITE OR OVEREATING: NOT AT ALL
7. TROUBLE CONCENTRATING ON THINGS, SUCH AS READING THE NEWSPAPER OR WATCHING TELEVISION: NOT AT ALL
4. FEELING TIRED OR HAVING LITTLE ENERGY: NOT AT ALL
SUM OF ALL RESPONSES TO PHQ9 QUESTIONS 1 & 2: 0
2. FEELING DOWN, DEPRESSED OR HOPELESS: NOT AT ALL
8. MOVING OR SPEAKING SO SLOWLY THAT OTHER PEOPLE COULD HAVE NOTICED. OR THE OPPOSITE, BEING SO FIGETY OR RESTLESS THAT YOU HAVE BEEN MOVING AROUND A LOT MORE THAN USUAL: NOT AT ALL
3. TROUBLE FALLING OR STAYING ASLEEP: NOT AT ALL
SUM OF ALL RESPONSES TO PHQ QUESTIONS 1-9: 0

## 2024-10-30 ASSESSMENT — ENCOUNTER SYMPTOMS
ABDOMINAL PAIN: 0
WHEEZING: 0
COUGH: 1
SHORTNESS OF BREATH: 0
CHEST TIGHTNESS: 0
NAUSEA: 1

## 2024-10-30 NOTE — PROGRESS NOTES
Subjective:      Patient ID: Shayna Stevens 2000 is a 24 y.o. female here for evaluation.     Chief Complaint   Patient presents with    Cough     X 1 year, no relief with OTC Vicks liquid or Mucinex    Chest Congestion    Chest Pain     Intermittent.  Cannot wear bra more than 1 hour due to causes pain.        Cough onset of 12 months - constant off and on.  Cough that at is productive.  Chest congestion.    Cough more prominent at night.  Mild sinus congestion but denies constant issues with sinuses.     Chest pain x 2-3 months.   Intermittent.  Worse with wearing a bra.  Taking off bra will slowly improve.  Frequent nausea.  Heartburn with certain diet.     Patient Active Problem List   Diagnosis    Severe episode of recurrent major depressive disorder, without psychotic features (HCC)    Adjustment disorder with anxious mood    Tobacco dependence syndrome    Contusion of knee and lower leg, left, initial encounter       Vitals:    10/30/24 0852   BP: 120/70   Pulse: 76   Resp: 16      Labs reviewed.     Lab Results   Component Value Date    LABA1C 4.8 04/27/2022     Lab Results   Component Value Date    EAG 84 04/27/2022       No components found for: \"CHLPL\"  Lab Results   Component Value Date    TRIG 44 04/27/2022     Lab Results   Component Value Date    HDL 55 04/27/2022     No components found for: \"LDLCALC\"  No components found for: \"LABVLDL\"      Chemistry        Component Value Date/Time     04/27/2022 1224    K 4.2 04/27/2022 1224     04/27/2022 1224    CO2 25 04/27/2022 1224    BUN 11 04/27/2022 1224    CREATININE 0.7 04/27/2022 1224        Component Value Date/Time    CALCIUM 9.0 04/27/2022 1224    ALKPHOS 63 04/27/2022 1224    AST 15 04/27/2022 1224    ALT 14 04/27/2022 1224    BILITOT 0.2 (L) 04/27/2022 1224            Lab Results   Component Value Date    TSH 1.410 04/27/2022       Lab Results   Component Value Date    WBC 9.3 04/27/2022    HGB 14.0 04/27/2022    HCT 43.7

## 2024-12-04 ENCOUNTER — OFFICE VISIT (OUTPATIENT)
Dept: FAMILY MEDICINE CLINIC | Age: 24
End: 2024-12-04

## 2024-12-04 VITALS
DIASTOLIC BLOOD PRESSURE: 92 MMHG | WEIGHT: 204.7 LBS | HEART RATE: 92 BPM | RESPIRATION RATE: 20 BRPM | BODY MASS INDEX: 33.04 KG/M2 | SYSTOLIC BLOOD PRESSURE: 118 MMHG

## 2024-12-04 DIAGNOSIS — J45.20 MILD INTERMITTENT REACTIVE AIRWAY DISEASE WITH WHEEZING WITHOUT COMPLICATION: ICD-10-CM

## 2024-12-04 DIAGNOSIS — K21.00 GASTROESOPHAGEAL REFLUX DISEASE WITH ESOPHAGITIS WITHOUT HEMORRHAGE: ICD-10-CM

## 2024-12-04 DIAGNOSIS — F31.60 BIPOLAR 1 DISORDER, MIXED (HCC): Primary | ICD-10-CM

## 2024-12-04 RX ORDER — VENLAFAXINE HYDROCHLORIDE 75 MG/1
75 CAPSULE, EXTENDED RELEASE ORAL DAILY
Qty: 30 CAPSULE | Refills: 1 | Status: SHIPPED | OUTPATIENT
Start: 2024-12-04

## 2024-12-04 RX ORDER — MONTELUKAST SODIUM 10 MG/1
10 TABLET ORAL DAILY
Qty: 90 TABLET | Refills: 0 | Status: SHIPPED | OUTPATIENT
Start: 2024-12-04

## 2024-12-04 ASSESSMENT — ENCOUNTER SYMPTOMS
ABDOMINAL PAIN: 0
SHORTNESS OF BREATH: 0
WHEEZING: 0
CHEST TIGHTNESS: 0

## 2024-12-04 NOTE — PROGRESS NOTES
Referral faxed to [facility].  Patient aware they will contact her to schedule appt.  
  Medication Sig Dispense Refill    omeprazole (PRILOSEC) 20 MG delayed release capsule Take 1 capsule by mouth every morning (before breakfast) 90 capsule 1    montelukast (SINGULAIR) 10 MG tablet Take 1 tablet by mouth daily 90 tablet 0    venlafaxine (EFFEXOR XR) 75 MG extended release capsule Take 1 capsule by mouth daily 30 capsule 1     No current facility-administered medications for this visit.

## 2024-12-05 ASSESSMENT — ENCOUNTER SYMPTOMS
COUGH: 0
NAUSEA: 0

## 2025-01-31 DIAGNOSIS — F31.60 BIPOLAR 1 DISORDER, MIXED (HCC): ICD-10-CM

## 2025-02-02 RX ORDER — VENLAFAXINE HYDROCHLORIDE 75 MG/1
75 CAPSULE, EXTENDED RELEASE ORAL DAILY
Qty: 30 CAPSULE | Refills: 1 | OUTPATIENT
Start: 2025-02-02

## 2025-02-04 RX ORDER — VENLAFAXINE HYDROCHLORIDE 75 MG/1
75 CAPSULE, EXTENDED RELEASE ORAL DAILY
Qty: 90 CAPSULE | Refills: 3 | Status: SHIPPED | OUTPATIENT
Start: 2025-02-04

## 2025-02-04 NOTE — TELEPHONE ENCOUNTER
Called patient and she is doing well on the medication.     She stated that the only symptoms she still has is a cough but she wasn't able to get in to the GI because her car broke down. But she plans to get the appointment r/s.

## 2025-03-10 DIAGNOSIS — J45.20 MILD INTERMITTENT REACTIVE AIRWAY DISEASE WITH WHEEZING WITHOUT COMPLICATION: ICD-10-CM

## 2025-03-11 RX ORDER — MONTELUKAST SODIUM 10 MG/1
10 TABLET ORAL DAILY
Qty: 90 TABLET | Refills: 3 | Status: SHIPPED | OUTPATIENT
Start: 2025-03-11

## 2025-08-13 DIAGNOSIS — K21.00 GASTROESOPHAGEAL REFLUX DISEASE WITH ESOPHAGITIS WITHOUT HEMORRHAGE: ICD-10-CM

## 2025-08-13 RX ORDER — OMEPRAZOLE 20 MG/1
20 CAPSULE, DELAYED RELEASE ORAL
Qty: 90 CAPSULE | Refills: 1 | Status: SHIPPED | OUTPATIENT
Start: 2025-08-13